# Patient Record
Sex: FEMALE | Race: BLACK OR AFRICAN AMERICAN | Employment: FULL TIME | URBAN - METROPOLITAN AREA
[De-identification: names, ages, dates, MRNs, and addresses within clinical notes are randomized per-mention and may not be internally consistent; named-entity substitution may affect disease eponyms.]

---

## 2019-11-04 ENCOUNTER — TRANSFERRED RECORDS (OUTPATIENT)
Dept: HEALTH INFORMATION MANAGEMENT | Facility: CLINIC | Age: 34
End: 2019-11-04

## 2019-11-04 LAB — PAP-ABSTRACT: NORMAL

## 2020-01-27 ENCOUNTER — OFFICE VISIT (OUTPATIENT)
Dept: OBGYN | Facility: CLINIC | Age: 35
End: 2020-01-27
Payer: COMMERCIAL

## 2020-01-27 VITALS
HEIGHT: 65 IN | HEART RATE: 105 BPM | BODY MASS INDEX: 26.86 KG/M2 | DIASTOLIC BLOOD PRESSURE: 72 MMHG | SYSTOLIC BLOOD PRESSURE: 128 MMHG | WEIGHT: 161.2 LBS

## 2020-01-27 DIAGNOSIS — D25.1 INTRAMURAL, SUBMUCOUS, AND SUBSEROUS LEIOMYOMA OF UTERUS: Primary | ICD-10-CM

## 2020-01-27 DIAGNOSIS — D25.0 INTRAMURAL, SUBMUCOUS, AND SUBSEROUS LEIOMYOMA OF UTERUS: Primary | ICD-10-CM

## 2020-01-27 DIAGNOSIS — N97.9 FEMALE INFERTILITY: ICD-10-CM

## 2020-01-27 DIAGNOSIS — D25.2 INTRAMURAL, SUBMUCOUS, AND SUBSEROUS LEIOMYOMA OF UTERUS: Primary | ICD-10-CM

## 2020-01-27 PROCEDURE — 99203 OFFICE O/P NEW LOW 30 MIN: CPT | Performed by: OBSTETRICS & GYNECOLOGY

## 2020-01-27 RX ORDER — FERROUS SULFATE 325(65) MG
325 TABLET ORAL
COMMUNITY
End: 2020-09-21

## 2020-01-27 RX ORDER — PRENATAL VIT/IRON FUM/FOLIC AC 27MG-0.8MG
1 TABLET ORAL DAILY
COMMUNITY
End: 2020-06-11

## 2020-01-27 ASSESSMENT — MIFFLIN-ST. JEOR: SCORE: 1434.14

## 2020-01-27 NOTE — PROGRESS NOTES
"Jose is a 34 year old   here to discuss her fibroids and her desire for pregnancy.  She has a long history.  I reviewed her previous imaging, clinic and operative reports through care everywhere.  She has a long history of fibroids and significant menorrhagia.  She has had to be on IV iron, due to the anemia.  She had an open myomectomy back in 2016.  She has also had a hysteroscopic polypectomy and been on lupron. Although, she wasn't able to complete the 6 month course, due to side effects.  She was recently seen by Infertility and diagnosed with bilateral tubal occlusion, although the exact cause isn't clear, it is presumed to be due to the fibroids.  ROS: No urinary frequency or dysuria, bladder or kidney problems  ROS: Ten point review of systems was reviewed and negative except the above.    PMH: Her past medical, surgical, and obstetric histories were reviewed and are documented in their appropriate chart areas.    ALL/Meds: Her medication and allergy histories were reviewed and are documented in their appropriate chart areas.    SH/FMH: Reviewed and documented in the appropriate area.    PE: /72   Pulse 105   Ht 1.638 m (5' 4.5\")   Wt 73.1 kg (161 lb 3.2 oz)   LMP 2020 (Exact Date)   Breastfeeding No   BMI 27.24 kg/m      U/S: 2019Measurements and comments  Uterus:  Longitudinal AP Transverse   8.9 7.0 9.4 cm   Position:  anteverted  Comments:  Irregular due to fibroids    Cervix and lower uterine segment are: Normal cervix and fibroids in CITLALI  Myometrium: heterogeneous    Myomata: numerous fibroids throughout the entire uterus - 10+ seen, but   not all documented due to size and difficulty seeing distinct outlines  Location Longitudinal AP Transverse    Anterior/ Right 4.3 3.9 5.2 cm submucosal: 0.6 cm to serosa; 25%   intracavitary   Anterior 2.1 1.6 2.2 cm intramural   Anterior 2.5 2.3 2.4 cm intramural   Fundal/ Right 3.8 3.5 3.2 subserosal   Left/CITLALI 2.8 2.1 2.1 subserosal "       Saline infusion sonohysterogram: no - patient's cavity was filled with   fluid (possible blood)  Endometrium: Anterior: 3.7 mm; Posterior: 4.3 mm (posterior to this   portion of the cavity showed more fluid with motion)  Blood flow seen throughout both portions of the cavity, and a small area   seen within the cavity from the posterior wall (possible blood clot)   measuring 0.7 x 0.5 x 0.6 cm    Right ovary:   Not visualized due to shadowing & gas    Left ovary:  Longitudinal AP Transverse   2.5 2.0 1.5 cm   Comments: appears normal    Adnexa:  no masses seen    Peritoneal fluid: absent    Other procedures done: none    Impression:   Uterus shows presence of multiple myomata as noted on previous ultrasound   examinations.  Overall there is not much of change compared to the   previous study 2018.  She the largest fibroid is on the anterior   wall towards the right of midline measures 4.3 cm x 3.9 cm x 5.2 cm it is   about 0.6 cm from the serosa and on the other side it this 25 percent in   the cavity.  She the other fibroids are both intramural and some R   subserosal as noted above.  Endometrial cavity showed presence of a small amount of fluid possibly   blood.  Part of the fibroid on the anterior wall right side is in the   endometrial cavity.  Endometrial lining appears normal.  Right ovary could not be very well visualized.  Left ovary appears normal.  No adnexal masses seen. .  The recommendation from Infertility is a myomectomy of the largest fibroids and then IVF. She would need a  for delivery, given the size of the largest fibroid.  She wanted to be seen today as she wants to know why her tubes are blocked.  I explained, that with no other history (PID, Endometriosis), the fibroids are the most likely answer.  Also, knowing the why, will not help her conceive.  Discussed the option of embolization vs surgical myomectomy.  I would be happy to refer her, but at this time, she wants to  follow up with the Infertility clinic she has seen    A/P:   Jose presents with (D25.1,  D25.0,  D25.2) Intramural, submucous, and subserous leiomyoma of uterus  (primary encounter diagnosis)  (N97.9) Female infertility  Comment: Out of 30 minutes spent face to face with the patient, > 50% of this was spent in consultation.  Plan: Follow up as needed        -    No orders of the defined types were placed in this encounter.        Geoffrey Watkins MD FACOG

## 2020-01-27 NOTE — PATIENT INSTRUCTIONS
If you have any questions regarding your visit, Please contact your care team.    [facility]: [phone]      Women s Health CLINIC HOURS TELEPHONE NUMBER   MD Daisy Cantu CMA Susie -    NICO Hu       Monday:       7:30-4:15 [place]  Wednesday:      Administrative  Thursday:       7:30-4:15 [place]  Friday:       Administrative     [facility]  [address] [place]  [address]  [phone] ask for [facility]  [address] [place] [address]  [phone] ask for [facility]    Imaging Scheduling for [place]:  [phone]    Imaging Scheduling for [place]: [phone]       Urgent Care locations:    [facility] Saturday and Sunday   9 am - 5 pm    Monday-Friday   12 pm - 8 pm  Saturday and Sunday   9 am - 5 pm   [phone]     [facility] Labor and Delivery:  [phone]    If you need a medication refill, please contact your pharmacy. Please allow 3 business days for your refill to be completed.  As always, Thank you for trusting us with your healthcare needs!

## 2020-03-24 ENCOUNTER — OFFICE VISIT (OUTPATIENT)
Dept: OBGYN | Facility: CLINIC | Age: 35
End: 2020-03-24
Payer: COMMERCIAL

## 2020-03-24 VITALS
DIASTOLIC BLOOD PRESSURE: 72 MMHG | HEIGHT: 64 IN | BODY MASS INDEX: 27.49 KG/M2 | WEIGHT: 161 LBS | SYSTOLIC BLOOD PRESSURE: 110 MMHG | HEART RATE: 68 BPM

## 2020-03-24 DIAGNOSIS — D21.9 MYOMA: ICD-10-CM

## 2020-03-24 DIAGNOSIS — N92.0 MENORRHAGIA WITH REGULAR CYCLE: Primary | ICD-10-CM

## 2020-03-24 LAB — HGB BLD-MCNC: 9.9 G/DL (ref 11.7–15.7)

## 2020-03-24 PROCEDURE — 99214 OFFICE O/P EST MOD 30 MIN: CPT | Performed by: OBSTETRICS & GYNECOLOGY

## 2020-03-24 PROCEDURE — 85018 HEMOGLOBIN: CPT | Performed by: OBSTETRICS & GYNECOLOGY

## 2020-03-24 PROCEDURE — 36415 COLL VENOUS BLD VENIPUNCTURE: CPT | Performed by: OBSTETRICS & GYNECOLOGY

## 2020-03-24 ASSESSMENT — ANXIETY QUESTIONNAIRES
6. BECOMING EASILY ANNOYED OR IRRITABLE: NOT AT ALL
GAD7 TOTAL SCORE: 0
1. FEELING NERVOUS, ANXIOUS, OR ON EDGE: NOT AT ALL
5. BEING SO RESTLESS THAT IT IS HARD TO SIT STILL: NOT AT ALL
3. WORRYING TOO MUCH ABOUT DIFFERENT THINGS: NOT AT ALL
7. FEELING AFRAID AS IF SOMETHING AWFUL MIGHT HAPPEN: NOT AT ALL
2. NOT BEING ABLE TO STOP OR CONTROL WORRYING: NOT AT ALL

## 2020-03-24 ASSESSMENT — MIFFLIN-ST. JEOR: SCORE: 1417.35

## 2020-03-24 ASSESSMENT — PATIENT HEALTH QUESTIONNAIRE - PHQ9
SUM OF ALL RESPONSES TO PHQ QUESTIONS 1-9: 0
5. POOR APPETITE OR OVEREATING: NOT AT ALL

## 2020-03-24 NOTE — Clinical Note
Please abstract the following data from this visit with this patient into the appropriate field in Epic:        Other Tests found in the patient's chart through Chart Review/Care Everywhere:    Pap smear done by [facility] this date: 11/4/19    Note to Abstraction: If this section is blank, no results were found via Chart Review/Care Everywhere.

## 2020-03-24 NOTE — NURSING NOTE
Please abstract the following data from this visit with this patient into the appropriate field in Epic:        Other Tests found in the patient's chart through Chart Review/Care Everywhere:    Pap smear done by [facility] this date: 11/4/19    Note to Abstraction: If this section is blank, no results were found via Chart Review/Care Everywhere.

## 2020-03-24 NOTE — PROGRESS NOTES
SUBJECTIVE:                                                   Jose Hurtado is a 34 year old female who presents to clinic today for the following health issue(s):  Patient presents with:  Consult: here to discuss treatment for fibroids      HPI the patient is a 34-year-old who is seen at this time for evaluation of a rapidly enlarging myomatous uterus that has caused menorrhagia and severe anemia.  The patient has had hemoglobins in the 7 range and has had iron infusions performed.  Her menses can last up to 7 days for her fiber which can be flooding.  She had an iron infusion last week.  She has had at least 2 hysteroscopic procedures for removals of polyps and a small fibroid but that has had no impact on her bleeding.  A recent hysterosalpingogram showed no fill of either fallopian tube.       Patient's last menstrual period was 2020..     Patient is sexually active, .  Using none for contraception.    reports that she has never smoked. She has never used smokeless tobacco.    STD testing offered?  Declined    Health maintenance updated:  yes    Today's PHQ-2 Score: No flowsheet data found.  Today's PHQ-9 Score:   PHQ-9 SCORE 3/24/2020   PHQ-9 Total Score 0     Today's KATHI-7 Score:   KATHI-7 SCORE 3/24/2020   Total Score 0       Problem list and histories reviewed & adjusted, as indicated.  Additional history: as documented.    Patient Active Problem List   Diagnosis     Lumbar pain     Past Surgical History:   Procedure Laterality Date     AS HYSTEROSCOPY W ENDOMETRIAL BX/POLYPECTOMY W/WO D&C  2018     MYOMECTOMY UTERUS  2016      Social History     Tobacco Use     Smoking status: Never Smoker     Smokeless tobacco: Never Used   Substance Use Topics     Alcohol use: No      Problem (# of Occurrences) Relation (Name,Age of Onset)    No Known Problems (6) Mother, Father, Maternal Grandmother, Maternal Grandfather, Paternal Grandmother, Paternal Grandfather            Current Outpatient Medications  "  Medication Sig     ferrous sulfate (FEROSUL) 325 (65 Fe) MG tablet Take 325 mg by mouth daily (with breakfast)     Prenatal Vit-Fe Fumarate-FA (PRENATAL MULTIVITAMIN W/IRON) 27-0.8 MG tablet Take 1 tablet by mouth daily     No current facility-administered medications for this visit.      No Known Allergies    ROS:  12 point review of systems negative other than symptoms noted below or in the HPI.  No urinary frequency or dysuria, bladder or kidney problems      OBJECTIVE:     /72   Pulse 68   Ht 1.613 m (5' 3.5\")   Wt 73 kg (161 lb)   LMP 03/20/2020   BMI 28.07 kg/m    Body mass index is 28.07 kg/m .    Exam:  Constitutional:  Appearance: Well nourished, well developed alert, in no acute distress  Gastrointestinal:  Abdominal Examination:  Abdomen nontender to palpation, tone normal without rigidity or guarding, no masses present, umbilicus without lesions; Liver/Spleen:  No hepatomegaly present, liver nontender to palpation; Hernias:  No hernias present  Lymphatic: Lymph Nodes:  No other lymphadenopathy present  Skin: General Inspection:  No rashes present, no lesions present, no areas of discoloration.  Neurologic:  Mental Status:  Oriented X3.  Normal strength and tone, sensory exam grossly normal, mentation intact and speech normal.    Psychiatric:  Mentation appears normal and affect normal/bright.  Pelvic Exam:  External Genitalia:     Normal appearance for age, no discharge present, no tenderness present, no inflammatory lesions present, color normal  Vagina:     Normal vaginal vault without central or paravaginal defects, no discharge present, no inflammatory lesions present, no masses present  Bladder:     Nontender to palpation  Urethra:   Urethral Body:  Urethra palpation normal, urethra structural support normal   Urethral Meatus:  No erythema or lesions present  Cervix:     Appearance healthy, no lesions present, nontender to palpation, no bleeding present  Uterus: 18 weeks size " multi-bosselated myomatous uterus.     Adnexa:     No adnexal tenderness present, no adnexal masses present  Perineum:     Perineum within normal limits, no evidence of trauma, no rashes or skin lesions present  Anus:     Anus within normal limits, no hemorrhoids present  Inguinal Lymph Nodes:     No lymphadenopathy present  Pubic Hair:     Normal pubic hair distribution for age  Genitalia and Groin:     No rashes present, no lesions present, no areas of discoloration, no masses present       In-Clinic Test Results:hgb 9.4 gm %    ASSESSMENT/PLAN:                                                      34-year-old patient with massive myomatous uterus and menorrhagia causing anemia.  By hysterosalpingogram she has interruption of both tubes by pressure intrinsically.  She has been suppressed with Lupron before.  Due to the pandemic situation we are not able to offer definitive surgery which at this point would be an exploratory laparotomy with multiple myomectomies in an attempt to try to get a functional unit and potentially open up tubal function.  We have gone over all the risks and complications of this.  The patient has been on Lupron before but states that she had a very serious reaction.  As I cannot guide her as to when definitive surgery would be available it would be nice to shut her down.  If she refuses any form of suppression we have no choice but to continue on with her iron infusions and try to keep her hemoglobin stable.  She is a RN at [employer].            Tato Jackson MD  [facility] [place]

## 2020-03-25 ASSESSMENT — ANXIETY QUESTIONNAIRES: GAD7 TOTAL SCORE: 0

## 2020-04-01 ENCOUNTER — ANCILLARY PROCEDURE (OUTPATIENT)
Dept: ULTRASOUND IMAGING | Facility: CLINIC | Age: 35
End: 2020-04-01
Attending: OBSTETRICS & GYNECOLOGY
Payer: COMMERCIAL

## 2020-04-01 ENCOUNTER — OFFICE VISIT (OUTPATIENT)
Dept: OBGYN | Facility: CLINIC | Age: 35
End: 2020-04-01
Attending: OBSTETRICS & GYNECOLOGY
Payer: COMMERCIAL

## 2020-04-01 VITALS
HEIGHT: 64 IN | HEART RATE: 68 BPM | SYSTOLIC BLOOD PRESSURE: 114 MMHG | DIASTOLIC BLOOD PRESSURE: 60 MMHG | BODY MASS INDEX: 27.83 KG/M2 | WEIGHT: 163 LBS

## 2020-04-01 DIAGNOSIS — D21.9 MYOMA: Primary | ICD-10-CM

## 2020-04-01 DIAGNOSIS — N92.0 MENORRHAGIA WITH REGULAR CYCLE: ICD-10-CM

## 2020-04-01 DIAGNOSIS — D64.89 ANEMIA DUE TO OTHER CAUSE, NOT CLASSIFIED: ICD-10-CM

## 2020-04-01 PROCEDURE — 99214 OFFICE O/P EST MOD 30 MIN: CPT | Performed by: OBSTETRICS & GYNECOLOGY

## 2020-04-01 PROCEDURE — 76830 TRANSVAGINAL US NON-OB: CPT | Performed by: OBSTETRICS & GYNECOLOGY

## 2020-04-01 ASSESSMENT — MIFFLIN-ST. JEOR: SCORE: 1426.42

## 2020-04-01 NOTE — PROGRESS NOTES
SUBJECTIVE:                                                   Jose Hurtado is a 34 year old female who presents to clinic today for the following health issue(s):  Patient presents with:  Ultrasound      HPI: The patient is seen at this time for evaluation of flooding menorrhagia and severe anemia.  She has had numerous hemoglobins down in the 7 range.  Her last hemoglobin in the office was 9.4 g%.  Ultrasound is planned for today.  When she gets her menses she can bleed anywhere from 7 to 14 days.  She was treated with transenemic acid this last cycle with no improvement.  She is severely debilitated by both the pain from the fibroids to heavy bleeding and the anemia.      Patient's last menstrual period was 2020..     Patient is sexually active, .  Using none for contraception.    reports that she has never smoked. She has never used smokeless tobacco.    STD testing offered?  Declined    Health maintenance updated:  yes    Today's PHQ-2 Score:   PHQ-2 (  [employer]) 2020   Q1: Little interest or pleasure in doing things 0   Q2: Feeling down, depressed or hopeless 0   PHQ-2 Score 0     Today's PHQ-9 Score:   PHQ-9 SCORE 3/24/2020   PHQ-9 Total Score 0     Today's KATHI-7 Score:   KATHI-7 SCORE 3/24/2020   Total Score 0       Problem list and histories reviewed & adjusted, as indicated.  Additional history: as documented.    Patient Active Problem List   Diagnosis     Lumbar pain     Past Surgical History:   Procedure Laterality Date     AS HYSTEROSCOPY W ENDOMETRIAL BX/POLYPECTOMY W/WO D&C  2018     MYOMECTOMY UTERUS  2016      Social History     Tobacco Use     Smoking status: Never Smoker     Smokeless tobacco: Never Used   Substance Use Topics     Alcohol use: No      Problem (# of Occurrences) Relation (Name,Age of Onset)    No Known Problems (6) Mother, Father, Maternal Grandmother, Maternal Grandfather, Paternal Grandmother, Paternal Grandfather            Current Outpatient Medications  "  Medication Sig     ferrous sulfate (FEROSUL) 325 (65 Fe) MG tablet Take 325 mg by mouth daily (with breakfast)     Prenatal Vit-Fe Fumarate-FA (PRENATAL MULTIVITAMIN W/IRON) 27-0.8 MG tablet Take 1 tablet by mouth daily     No current facility-administered medications for this visit.      No Known Allergies    ROS:  12 point review of systems negative other than symptoms noted below or in the HPI.  No urinary frequency or dysuria, bladder or kidney problems      OBJECTIVE:     /60   Pulse 68   Ht 1.613 m (5' 3.5\")   Wt 73.9 kg (163 lb)   LMP 03/20/2020   BMI 28.42 kg/m    Body mass index is 28.42 kg/m .    Exam:  Constitutional:  Appearance: Well nourished, well developed alert, in no acute distress  No Pelvic Exam performed     In-Clinic Test Results:  Results for orders placed or performed in visit on 04/01/20   US Pelvic Complete w Transvaginal     Status: None    Narrative    US Pelvic Complete w Transvaginal   Order #: [id number] Accession #: [id number]   Study Notes?      Irasema Madison on 4/1/2020 ? 2:07 PM    ?   Gynecological Ultrasound Report  Pelvic U/S - Transvaginal  [facility]  Referring Provider: Dr. Tato Jackson  Sonographer:  Irasema Madison  Indication: Bleeding/Menses- Menorrhagia (heavy menses)  LMP: Patient's last menstrual period was 03/20/2020.  History:   Gynecological Ultrasonography:   Uterus: anteverted. Large myomatous uterus; 2 largest fibroids #1= 6.8x   5.6x 6cm,  #2= 6.3x 6.1x 5.3  Size: 14.17 x 9.55 x 10.36 cm  Endometrium: Thickness Total 3.51 mm  Findings: Endometrium only visualized in CITLALI  Right Ovary: 3.35 x 3.34 x 2.82 cm. Wnl  Left Ovary: 2.76 x 2.67 x 2.62 cm. Wnl  Cul de Sac Free Fluid: No free fluid  ?   Impression: Large myomatous uterus with multiple fibroids present  ?   ?   ?         Discussed in office         ASSESSMENT/PLAN:                                                      34-year-old patient with markedly enlarged myomatous " uterus.  Her to biggest fibroids are over 6 cm.  There are multiple fibroids throughout.  She is not bleeding at this time but anticipates a cycle in the next 10 days.  We have encouraged her to continue with her iron infusions and dietary supplements.  We discussed the possibility of suppression.  She did not tolerate Depo-Lupron due to side effects.  We did discuss Depo-Provera as a means of trying to suppress her down to get past this operating room shut down due to the pandemic.  If she bleeds so heavily that she is hospitalized we may need to move her up to an emergent position on the schedule for an exploratory laparotomy with multiple myomectomies.  The patient fully understands the ramifications of the surgery and the treatments we have discussed.  She will contact us with her next cycle and we will try to set something up for early summer.          Tato Jackson MD  [facility] [place]

## 2020-05-14 ENCOUNTER — TELEPHONE (OUTPATIENT)
Dept: OBGYN | Facility: CLINIC | Age: 35
End: 2020-05-14

## 2020-05-14 NOTE — TELEPHONE ENCOUNTER
"4/1/20 seen by Dr Jackson for fibroids and heavy bleeding and anemia  \" If she bleeds so heavily that she is hospitalized we may need to move her up to an emergent position on the schedule for an exploratory laparotomy with multiple myomectomies.  The patient fully understands the ramifications of the surgery and the treatments we have discussed.  She will contact us with her next cycle and we will try to set something up for early summer.\"     Was instructed to call and give an update on her menstrual cycles:  still very heavy, the first four days are very heavy \"nonstop\" and Day 5 lighter and total lasting 7 days.  Still having pain from the fibroids on and off during the day. Inquiring on future surgery and scheduling.    Routing to Dr Jackson to advise based on her update.  Call with response.    Guillermina Justice RN on 5/14/2020 at 11:43 AM        "

## 2020-05-20 ENCOUNTER — PREP FOR PROCEDURE (OUTPATIENT)
Dept: OBGYN | Facility: CLINIC | Age: 35
End: 2020-05-20

## 2020-05-20 ENCOUNTER — TELEPHONE (OUTPATIENT)
Dept: OBGYN | Facility: CLINIC | Age: 35
End: 2020-05-20

## 2020-05-20 DIAGNOSIS — D21.9 MYOMA: Primary | ICD-10-CM

## 2020-05-20 NOTE — TELEPHONE ENCOUNTER
Associated Diagnoses     Myoma [D21.9]  - Primary        Source Order Set     Order Set Name  Order ID     [id number]    Case Request: Case Info     Panel 1 (Provider: Tato Jackson MD)     Procedures  Laterality  Anesthesia  Region    LAPAROTOMY, multiple myomectomies  N/A  General        Requested date:     Location:  OR    Patient class:        Pre-op diagnoses:  Myoma    Scheduling Instructions     Additional Instructions for the Case            Detailed Information     Priority and Order Details             Order Questions     Question  Answer  Comment    Clinical Tier:  Tier 2 (Intermediate)

## 2020-05-20 NOTE — TELEPHONE ENCOUNTER
Patient called back wanting to know when she can schedule. Gave her some dates that are open now in June, but could potentially fill as he is scheduling cases daily. She will check with her work and call back.

## 2020-05-27 DIAGNOSIS — Z11.59 ENCOUNTER FOR SCREENING FOR OTHER VIRAL DISEASES: Primary | ICD-10-CM

## 2020-05-27 PROBLEM — D21.9 MYOMA: Status: ACTIVE | Noted: 2020-05-27

## 2020-05-27 NOTE — TELEPHONE ENCOUNTER
Type of surgery: Exploratory Laparotomy  Location of surgery: [facility]  Date and time of surgery:  6/23/20 9:30-11:40  Surgeon: Renetta  Pre-Op Appt Date: patient needs to call back to schedule  Post-Op Appt Date:    Packet sent out: Yes 5/27/20  Pre-cert/Authorization completed:  TBD  Date: Tamanna 5/27/20     [id number]  D21.9

## 2020-06-11 ENCOUNTER — OFFICE VISIT (OUTPATIENT)
Dept: OBGYN | Facility: CLINIC | Age: 35
End: 2020-06-11
Payer: COMMERCIAL

## 2020-06-11 VITALS — DIASTOLIC BLOOD PRESSURE: 68 MMHG | WEIGHT: 164 LBS | BODY MASS INDEX: 28.6 KG/M2 | SYSTOLIC BLOOD PRESSURE: 112 MMHG

## 2020-06-11 DIAGNOSIS — Z01.818 PRE-OP EXAMINATION: Primary | ICD-10-CM

## 2020-06-11 LAB — HGB BLD-MCNC: 10.1 G/DL (ref 11.7–15.7)

## 2020-06-11 PROCEDURE — 85018 HEMOGLOBIN: CPT | Performed by: OBSTETRICS & GYNECOLOGY

## 2020-06-11 PROCEDURE — 99213 OFFICE O/P EST LOW 20 MIN: CPT | Performed by: OBSTETRICS & GYNECOLOGY

## 2020-06-11 PROCEDURE — 36415 COLL VENOUS BLD VENIPUNCTURE: CPT | Performed by: OBSTETRICS & GYNECOLOGY

## 2020-06-11 NOTE — PROGRESS NOTES
SUBJECTIVE:                                                   Jose Hurtado is a 34 year old female who presents to clinic today for the following health issue(s):  Patient presents with:  Pre-Op Exam: LAPAROTOMY, multiple myomectomies 2020          HPI: The patient is a 34-year-old  0 who has been found to have 2 large uterine fibroids at least.  1 is 6.8 and the other is 6.3 cm.  Her ovaries look normal by ultrasound.  She is admitted at this time for exploratory laparotomy for a excision of the fibroids and reconstruction of the uterus.  She has desire for fertility down the road.  She has been anemic in the past and has been on iron and hemoglobin is pending.  She denies any recent infections.  She will be COVID tested 3 days prior to the procedure.  She understands the risk and complications length of stay and disability from work afterwards.      Patient's last menstrual period was 2020 (exact date)..     Patient is sexually active, .  Using none for contraception.    reports that she has never smoked. She has never used smokeless tobacco.    STD testing offered?  Declined    Health maintenance updated:  yes    Today's PHQ-2 Score:   PHQ-2 (  [employer]) 2020   Q1: Little interest or pleasure in doing things 0   Q2: Feeling down, depressed or hopeless 0   PHQ-2 Score 0     Today's PHQ-9 Score:   PHQ-9 SCORE 3/24/2020   PHQ-9 Total Score 0     Today's KATHI-7 Score:   KATHI-7 SCORE 3/24/2020   Total Score 0       Problem list and histories reviewed & adjusted, as indicated.  Additional history: as documented.    Patient Active Problem List   Diagnosis     Lumbar pain     Myoma     Past Surgical History:   Procedure Laterality Date     AS HYSTEROSCOPY W ENDOMETRIAL BX/POLYPECTOMY W/WO D&C  2018     MYOMECTOMY UTERUS  2016      Social History     Tobacco Use     Smoking status: Never Smoker     Smokeless tobacco: Never Used   Substance Use Topics     Alcohol use: No      Problem (# of  Occurrences) Relation (Name,Age of Onset)    No Known Problems (6) Mother, Father, Maternal Grandmother, Maternal Grandfather, Paternal Grandmother, Paternal Grandfather            Current Outpatient Medications   Medication Sig     ferrous sulfate (FEROSUL) 325 (65 Fe) MG tablet Take 325 mg by mouth daily (with breakfast)     No current facility-administered medications for this visit.      No Known Allergies    ROS:  12 point review of systems negative other than symptoms noted below or in the HPI.  No urinary frequency or dysuria, bladder or kidney problems      OBJECTIVE:     /68   Wt 74.4 kg (164 lb)   LMP 06/04/2020 (Exact Date)   Breastfeeding No   BMI 28.60 kg/m    Body mass index is 28.6 kg/m .    Exam:  Constitutional:  Appearance: Well nourished, well developed alert, in no acute distress  Neck:  Lymph Nodes:  No lymphadenopathy present; Thyroid:  Gland size normal, nontender, no nodules or masses present on palpation  Chest:  Respiratory Effort:  Breathing unlabored. Clear to auscultation bilaterally.   Cardiovascular: Heart: Auscultation:  Regular rate, normal rhythm, no murmurs present  Gastrointestinal:  Abdominal Examination:  Abdomen nontender to palpation, tone normal without rigidity or guarding, no masses present, umbilicus without lesions; Liver/Spleen:  No hepatomegaly present, liver nontender to palpation; Hernias:  No hernias present  Lymphatic: Lymph Nodes:  No other lymphadenopathy present  Skin: General Inspection:  No rashes present, no lesions present, no areas of discoloration.  Neurologic:  Mental Status:  Oriented X3.  Normal strength and tone, sensory exam grossly normal, mentation intact and speech normal.    Psychiatric:  Mentation appears normal and affect normal/bright.  No Pelvic Exam performed     In-Clinic Test Results: 10.1 g%      ASSESSMENT/PLAN:                                                      34-year-old female with large uterine fibroids admitted at this  time for open myomectomy.  She understands the risks and complications including general anesthesia blood loss infection injury to bowel bladder and ureters.  She agrees that she will not attempt pregnancy for at least 4 months after surgery and that she will need a  section for delivery if the dissections are deep.      Tato Jackson MD  [facility]

## 2020-06-20 DIAGNOSIS — Z11.59 ENCOUNTER FOR SCREENING FOR OTHER VIRAL DISEASES: ICD-10-CM

## 2020-06-20 PROCEDURE — 99000 SPECIMEN HANDLING OFFICE-LAB: CPT | Performed by: OBSTETRICS & GYNECOLOGY

## 2020-06-20 PROCEDURE — U0003 INFECTIOUS AGENT DETECTION BY NUCLEIC ACID (DNA OR RNA); SEVERE ACUTE RESPIRATORY SYNDROME CORONAVIRUS 2 (SARS-COV-2) (CORONAVIRUS DISEASE [COVID-19]), AMPLIFIED PROBE TECHNIQUE, MAKING USE OF HIGH THROUGHPUT TECHNOLOGIES AS DESCRIBED BY CMS-2020-01-R: HCPCS | Mod: 90 | Performed by: OBSTETRICS & GYNECOLOGY

## 2020-06-22 LAB
SARS-COV-2 RNA SPEC QL NAA+PROBE: NOT DETECTED
SPECIMEN SOURCE: NORMAL

## 2020-06-22 RX ORDER — ACETAMINOPHEN 325 MG/1
325-650 TABLET ORAL 4 TIMES DAILY PRN
COMMUNITY
End: 2020-09-21

## 2020-06-22 RX ORDER — TRANEXAMIC ACID 650 MG/1
1300 TABLET ORAL 2 TIMES DAILY PRN
COMMUNITY
End: 2020-09-21

## 2020-06-22 RX ORDER — PHENAZOPYRIDINE HYDROCHLORIDE 200 MG/1
200 TABLET, FILM COATED ORAL ONCE
Status: CANCELLED | OUTPATIENT
Start: 2020-06-22 | End: 2020-06-22

## 2020-06-22 NOTE — PROGRESS NOTES
PTA medications updated by Medication Scribe prior to surgery via phone call with patient      -LAST DOSES ENTERED BY NURSE-    Medication history sources: Patient, Surescripts and H&P  Medication history source reliability: Good  Adherence assessment: N/A Not Observed    Significant changes made to the medication list:  None      Additional medication history information:   None        Prior to Admission medications    Medication Sig Last Dose Taking? Auth Provider   acetaminophen (TYLENOL) 325 MG tablet Take 325-650 mg by mouth 4 times daily as needed for mild pain  at PRN Yes Reported, Patient   ferrous sulfate (FEROSUL) 325 (65 Fe) MG tablet Take 325 mg by mouth daily (with breakfast)  Yes Reported, Patient   tranexamic acid (LYSTEDA) 650 MG tablet Take 1,300 mg by mouth 2 times daily as needed (during menses)  at PRN Yes Reported, Patient

## 2020-06-22 NOTE — H&P
Office Visit     2020  [facility] Tato Plasencia MD   OB/Gyn   Myoma +2 more   Dx   Ultrasound ; Referred by Tato Jackson MD   Reason for Visit    Progress Notes           SUBJECTIVE:                                                   Jose Hurtado is a 34 year old female who presents to clinic today for the following health issue(s):  Patient presents with:  Ultrasound       HPI: The patient is seen at this time for evaluation of flooding menorrhagia and severe anemia.  She has had numerous hemoglobins down in the 7 range.  Her last hemoglobin in the office was 9.4 g%.  Ultrasound is planned for today.  When she gets her menses she can bleed anywhere from 7 to 14 days.  She was treated with transenemic acid this last cycle with no improvement.  She is severely debilitated by both the pain from the fibroids to heavy bleeding and the anemia.        Patient's last menstrual period was 2020..      Patient is sexually active, .  Using none for contraception.    reports that she has never smoked. She has never used smokeless tobacco.     STD testing offered?  Declined     Health maintenance updated:  yes     Today's PHQ-2 Score:   PHQ-2 (  [employer]) 2020   Q1: Little interest or pleasure in doing things 0   Q2: Feeling down, depressed or hopeless 0   PHQ-2 Score 0     Today's PHQ-9 Score:   PHQ-9 SCORE 3/24/2020   PHQ-9 Total Score 0     Today's KATHI-7 Score:   KATHI-7 SCORE 3/24/2020   Total Score 0        Problem list and histories reviewed & adjusted, as indicated.  Additional history: as documented.         Patient Active Problem List   Diagnosis     Lumbar pain             Past Surgical History:   Procedure Laterality Date     AS HYSTEROSCOPY W ENDOMETRIAL BX/POLYPECTOMY W/WO D&C   2018     MYOMECTOMY UTERUS   2016      Social History           Tobacco Use     Smoking status: Never Smoker     Smokeless tobacco: Never Used   Substance Use Topics     Alcohol use: No      " Problem (# of Occurrences) Relation (Name,Age of Onset)     No Known Problems (6) Mother, Father, Maternal Grandmother, Maternal Grandfather, Paternal Grandmother, Paternal Grandfather                   Current Outpatient Medications   Medication Sig     ferrous sulfate (FEROSUL) 325 (65 Fe) MG tablet Take 325 mg by mouth daily (with breakfast)     Prenatal Vit-Fe Fumarate-FA (PRENATAL MULTIVITAMIN W/IRON) 27-0.8 MG tablet Take 1 tablet by mouth daily      No current facility-administered medications for this visit.      No Known Allergies     ROS:  12 point review of systems negative other than symptoms noted below or in the HPI.  No urinary frequency or dysuria, bladder or kidney problems        OBJECTIVE:      /60   Pulse 68   Ht 1.613 m (5' 3.5\")   Wt 73.9 kg (163 lb)   LMP 03/20/2020   BMI 28.42 kg/m    Body mass index is 28.42 kg/m .     Exam:  Constitutional:  Appearance: Well nourished, well developed alert, in no acute distress  No Pelvic Exam performed      In-Clinic Test Results:      Results for orders placed or performed in visit on 04/01/20   US Pelvic Complete w Transvaginal     Status: None     Narrative     US Pelvic Complete w Transvaginal   Order #: [id number] Accession #: [id number]   Study Notes?      Irasema Madison on 4/1/2020 ? 2:07 PM    ?   Gynecological Ultrasound Report  Pelvic U/S - Transvaginal  [facility]  Referring Provider: Dr. Tato Jackson  Sonographer:  Irasema Madison  Indication: Bleeding/Menses- Menorrhagia (heavy menses)  LMP: Patient's last menstrual period was 03/20/2020.  History:   Gynecological Ultrasonography:   Uterus: anteverted. Large myomatous uterus; 2 largest fibroids #1= 6.8x   5.6x 6cm,  #2= 6.3x 6.1x 5.3  Size: 14.17 x 9.55 x 10.36 cm  Endometrium: Thickness Total 3.51 mm  Findings: Endometrium only visualized in CITLALI  Right Ovary: 3.35 x 3.34 x 2.82 cm. Wnl  Left Ovary: 2.76 x 2.67 x 2.62 cm. Wnl  Cul de Sac Free Fluid: No free " "fluid  ?   Impression: Large myomatous uterus with multiple fibroids present  ?   ?   ?          Discussed in office           ASSESSMENT/PLAN:                                                       34-year-old patient with markedly enlarged myomatous uterus.  Her to biggest fibroids are over 6 cm.  There are multiple fibroids throughout.  She is not bleeding at this time but anticipates a cycle in the next 10 days.  We have encouraged her to continue with her iron infusions and dietary supplements.  We discussed the possibility of suppression.  She did not tolerate Depo-Lupron due to side effects.  We did discuss Depo-Provera as a means of trying to suppress her down to get past this operating room shut down due to the pandemic.  If she bleeds so heavily that she is hospitalized we may need to move her up to an emergent position on the schedule for an exploratory laparotomy with multiple myomectomies.  The patient fully understands the ramifications of the surgery and the treatments we have discussed.  She will contact us with her next cycle and we will try to set something up for early summer.              Tato Jackson MD  [facility]      Instructions      After Visit Summary (Automatic SnapShot taken 4/1/2020)   Additional Documentation     Vitals:     /60    Pulse 68    Ht 1.613 m (5' 3.5\")    Wt 73.9 kg (163 lb)    LMP 03/20/2020    BMI 28.42 kg/m     BSA 1.82 m     Flowsheets:     Vitals Reassessment,    NICU VS,    Anthropometrics,    Ambulatory Assessments       Encounter Info:     Billing Info,    History,    Allergies,    Detailed Report       AVS Reports     Date/Time  Report  Action  User    4/1/2020  2:29 PM  After Visit Summary  Automatically Generated  Tato Jackson MD    Encounter Information      Provider  Department  Encounter #  Center    4/1/2020 1:45 PM  Tato Jackson MD  [facility]  [id number]  [place]    Reviewed this Encounter      Medications  " Problems  Allergies  History    Shelbie Skinner MA    Reviewed  ADL, Alcohol, Drug Use, Family, Medical, Sexual Activity, Surgical, Tobacco    Communicable/Travel screen     Communicable/Travel Screening  1/27/2020  3/24/2020  4/1/2020  6/11/2020  6/20/2020    In the last month, have you been in contact with someone who was confirmed or suspected to have Coronavirus / COVID-19?  -  No / Unsure  No / Unsure  No / Unsure  Unable to assess    Do you have any of the following symptoms?  None of these  None of these  -  -  Unable to assess    Have you traveled internationally in the last month?  No  No  No  No  Unable to assess    View Complete Flowsheet     Orders Placed      None   Medication Changes        None      Medication List     Visit Diagnoses         Myoma      Menorrhagia with regular cycle      Anemia due to other cause, not classified      Problem List

## 2020-06-23 ENCOUNTER — ANESTHESIA (OUTPATIENT)
Dept: SURGERY | Facility: CLINIC | Age: 35
End: 2020-06-23
Payer: COMMERCIAL

## 2020-06-23 ENCOUNTER — SURGERY (OUTPATIENT)
Age: 35
End: 2020-06-23
Payer: COMMERCIAL

## 2020-06-23 ENCOUNTER — HOSPITAL ENCOUNTER (INPATIENT)
Facility: CLINIC | Age: 35
LOS: 1 days | Discharge: HOME | End: 2020-06-25
Attending: OBSTETRICS & GYNECOLOGY | Admitting: OBSTETRICS & GYNECOLOGY
Payer: COMMERCIAL

## 2020-06-23 ENCOUNTER — ANESTHESIA EVENT (OUTPATIENT)
Dept: SURGERY | Facility: CLINIC | Age: 35
End: 2020-06-23
Payer: COMMERCIAL

## 2020-06-23 DIAGNOSIS — D21.9 MYOMA: ICD-10-CM

## 2020-06-23 LAB
B-HCG SERPL-ACNC: <1 IU/L (ref 0–5)
HGB BLD-MCNC: 11.5 G/DL (ref 11.7–15.7)

## 2020-06-23 PROCEDURE — 88305 TISSUE EXAM BY PATHOLOGIST: CPT | Mod: 26 | Performed by: OBSTETRICS & GYNECOLOGY

## 2020-06-23 PROCEDURE — 25800030 ZZH RX IP 258 OP 636: Performed by: OBSTETRICS & GYNECOLOGY

## 2020-06-23 PROCEDURE — 25000132 ZZH RX MED GY IP 250 OP 250 PS 637: Performed by: ANESTHESIOLOGY

## 2020-06-23 PROCEDURE — 36415 COLL VENOUS BLD VENIPUNCTURE: CPT | Performed by: OBSTETRICS & GYNECOLOGY

## 2020-06-23 PROCEDURE — 25000566 ZZH SEVOFLURANE, EA 15 MIN: Performed by: OBSTETRICS & GYNECOLOGY

## 2020-06-23 PROCEDURE — 25000128 H RX IP 250 OP 636: Performed by: OBSTETRICS & GYNECOLOGY

## 2020-06-23 PROCEDURE — 25000125 ZZHC RX 250: Performed by: OBSTETRICS & GYNECOLOGY

## 2020-06-23 PROCEDURE — 37000008 ZZH ANESTHESIA TECHNICAL FEE, 1ST 30 MIN: Performed by: OBSTETRICS & GYNECOLOGY

## 2020-06-23 PROCEDURE — 71000012 ZZH RECOVERY PHASE 1 LEVEL 1 FIRST HR: Performed by: OBSTETRICS & GYNECOLOGY

## 2020-06-23 PROCEDURE — 12000000 ZZH R&B MED SURG/OB

## 2020-06-23 PROCEDURE — P9041 ALBUMIN (HUMAN),5%, 50ML: HCPCS | Performed by: NURSE ANESTHETIST, CERTIFIED REGISTERED

## 2020-06-23 PROCEDURE — 71000013 ZZH RECOVERY PHASE 1 LEVEL 1 EA ADDTL HR: Performed by: OBSTETRICS & GYNECOLOGY

## 2020-06-23 PROCEDURE — 40000170 ZZH STATISTIC PRE-PROCEDURE ASSESSMENT II: Performed by: OBSTETRICS & GYNECOLOGY

## 2020-06-23 PROCEDURE — 25000128 H RX IP 250 OP 636: Performed by: NURSE ANESTHETIST, CERTIFIED REGISTERED

## 2020-06-23 PROCEDURE — 85018 HEMOGLOBIN: CPT | Performed by: OBSTETRICS & GYNECOLOGY

## 2020-06-23 PROCEDURE — 36000056 ZZH SURGERY LEVEL 3 1ST 30 MIN: Performed by: OBSTETRICS & GYNECOLOGY

## 2020-06-23 PROCEDURE — 37000009 ZZH ANESTHESIA TECHNICAL FEE, EACH ADDTL 15 MIN: Performed by: OBSTETRICS & GYNECOLOGY

## 2020-06-23 PROCEDURE — 88305 TISSUE EXAM BY PATHOLOGIST: CPT | Mod: 26 | Performed by: PATHOLOGY

## 2020-06-23 PROCEDURE — 25000128 H RX IP 250 OP 636: Performed by: ANESTHESIOLOGY

## 2020-06-23 PROCEDURE — 25800030 ZZH RX IP 258 OP 636: Performed by: NURSE ANESTHETIST, CERTIFIED REGISTERED

## 2020-06-23 PROCEDURE — 25000125 ZZHC RX 250: Performed by: NURSE ANESTHETIST, CERTIFIED REGISTERED

## 2020-06-23 PROCEDURE — 36000058 ZZH SURGERY LEVEL 3 EA 15 ADDTL MIN: Performed by: OBSTETRICS & GYNECOLOGY

## 2020-06-23 PROCEDURE — C1765 ADHESION BARRIER: HCPCS | Performed by: OBSTETRICS & GYNECOLOGY

## 2020-06-23 PROCEDURE — 84702 CHORIONIC GONADOTROPIN TEST: CPT | Performed by: OBSTETRICS & GYNECOLOGY

## 2020-06-23 PROCEDURE — 27210794 ZZH OR GENERAL SUPPLY STERILE: Performed by: OBSTETRICS & GYNECOLOGY

## 2020-06-23 PROCEDURE — 88305 TISSUE EXAM BY PATHOLOGIST: CPT | Performed by: OBSTETRICS & GYNECOLOGY

## 2020-06-23 PROCEDURE — 58546 LAPARO-MYOMECTOMY COMPLEX: CPT | Performed by: OBSTETRICS & GYNECOLOGY

## 2020-06-23 RX ORDER — OXYCODONE AND ACETAMINOPHEN 5; 325 MG/1; MG/1
1-2 TABLET ORAL EVERY 4 HOURS PRN
Status: DISCONTINUED | OUTPATIENT
Start: 2020-06-23 | End: 2020-06-25 | Stop reason: HOSPADM

## 2020-06-23 RX ORDER — BUPIVACAINE HYDROCHLORIDE 2.5 MG/ML
INJECTION, SOLUTION INFILTRATION; PERINEURAL PRN
Status: DISCONTINUED | OUTPATIENT
Start: 2020-06-23 | End: 2020-06-23 | Stop reason: HOSPADM

## 2020-06-23 RX ORDER — ONDANSETRON 2 MG/ML
4 INJECTION INTRAMUSCULAR; INTRAVENOUS EVERY 30 MIN PRN
Status: DISCONTINUED | OUTPATIENT
Start: 2020-06-23 | End: 2020-06-23 | Stop reason: HOSPADM

## 2020-06-23 RX ORDER — MEPERIDINE HYDROCHLORIDE 25 MG/ML
12.5 INJECTION INTRAMUSCULAR; INTRAVENOUS; SUBCUTANEOUS
Status: DISCONTINUED | OUTPATIENT
Start: 2020-06-23 | End: 2020-06-23 | Stop reason: HOSPADM

## 2020-06-23 RX ORDER — FENTANYL CITRATE 50 UG/ML
25-50 INJECTION, SOLUTION INTRAMUSCULAR; INTRAVENOUS
Status: DISCONTINUED | OUTPATIENT
Start: 2020-06-23 | End: 2020-06-23 | Stop reason: HOSPADM

## 2020-06-23 RX ORDER — NALOXONE HYDROCHLORIDE 0.4 MG/ML
INJECTION, SOLUTION INTRAMUSCULAR; INTRAVENOUS; SUBCUTANEOUS PRN
Status: DISCONTINUED | OUTPATIENT
Start: 2020-06-23 | End: 2020-06-23

## 2020-06-23 RX ORDER — HYDROMORPHONE HYDROCHLORIDE 1 MG/ML
0.2 INJECTION, SOLUTION INTRAMUSCULAR; INTRAVENOUS; SUBCUTANEOUS
Status: DISCONTINUED | OUTPATIENT
Start: 2020-06-23 | End: 2020-06-23

## 2020-06-23 RX ORDER — LIDOCAINE 40 MG/G
CREAM TOPICAL
Status: DISCONTINUED | OUTPATIENT
Start: 2020-06-23 | End: 2020-06-25 | Stop reason: HOSPADM

## 2020-06-23 RX ORDER — GLYCOPYRROLATE 0.2 MG/ML
INJECTION, SOLUTION INTRAMUSCULAR; INTRAVENOUS PRN
Status: DISCONTINUED | OUTPATIENT
Start: 2020-06-23 | End: 2020-06-23

## 2020-06-23 RX ORDER — ONDANSETRON 2 MG/ML
INJECTION INTRAMUSCULAR; INTRAVENOUS PRN
Status: DISCONTINUED | OUTPATIENT
Start: 2020-06-23 | End: 2020-06-23

## 2020-06-23 RX ORDER — PROPOFOL 10 MG/ML
INJECTION, EMULSION INTRAVENOUS CONTINUOUS PRN
Status: DISCONTINUED | OUTPATIENT
Start: 2020-06-23 | End: 2020-06-23

## 2020-06-23 RX ORDER — OXYCODONE AND ACETAMINOPHEN 5; 325 MG/1; MG/1
1-2 TABLET ORAL EVERY 4 HOURS PRN
Qty: 36 TABLET | Refills: 0 | Status: SHIPPED | OUTPATIENT
Start: 2020-06-23 | End: 2020-07-07

## 2020-06-23 RX ORDER — ONDANSETRON 4 MG/1
4 TABLET, ORALLY DISINTEGRATING ORAL EVERY 6 HOURS PRN
Status: DISCONTINUED | OUTPATIENT
Start: 2020-06-23 | End: 2020-06-25 | Stop reason: HOSPADM

## 2020-06-23 RX ORDER — ONDANSETRON 2 MG/ML
4 INJECTION INTRAMUSCULAR; INTRAVENOUS EVERY 6 HOURS PRN
Status: DISCONTINUED | OUTPATIENT
Start: 2020-06-23 | End: 2020-06-25 | Stop reason: HOSPADM

## 2020-06-23 RX ORDER — NEOSTIGMINE METHYLSULFATE 1 MG/ML
VIAL (ML) INJECTION PRN
Status: DISCONTINUED | OUTPATIENT
Start: 2020-06-23 | End: 2020-06-23

## 2020-06-23 RX ORDER — CEFAZOLIN SODIUM 2 G/100ML
2 INJECTION, SOLUTION INTRAVENOUS EVERY 8 HOURS
Status: DISCONTINUED | OUTPATIENT
Start: 2020-06-23 | End: 2020-06-23

## 2020-06-23 RX ORDER — CEFAZOLIN SODIUM 2 G/100ML
2 INJECTION, SOLUTION INTRAVENOUS
Status: COMPLETED | OUTPATIENT
Start: 2020-06-23 | End: 2020-06-23

## 2020-06-23 RX ORDER — CEFAZOLIN SODIUM 1 G/3ML
1 INJECTION, POWDER, FOR SOLUTION INTRAMUSCULAR; INTRAVENOUS SEE ADMIN INSTRUCTIONS
Status: DISCONTINUED | OUTPATIENT
Start: 2020-06-23 | End: 2020-06-23 | Stop reason: HOSPADM

## 2020-06-23 RX ORDER — CEFAZOLIN SODIUM 2 G/100ML
2 INJECTION, SOLUTION INTRAVENOUS EVERY 8 HOURS
Status: COMPLETED | OUTPATIENT
Start: 2020-06-23 | End: 2020-06-24

## 2020-06-23 RX ORDER — HYDROMORPHONE HYDROCHLORIDE 1 MG/ML
.3-.5 INJECTION, SOLUTION INTRAMUSCULAR; INTRAVENOUS; SUBCUTANEOUS EVERY 10 MIN PRN
Status: DISCONTINUED | OUTPATIENT
Start: 2020-06-23 | End: 2020-06-23 | Stop reason: HOSPADM

## 2020-06-23 RX ORDER — NALOXONE HYDROCHLORIDE 0.4 MG/ML
.1-.4 INJECTION, SOLUTION INTRAMUSCULAR; INTRAVENOUS; SUBCUTANEOUS
Status: DISCONTINUED | OUTPATIENT
Start: 2020-06-23 | End: 2020-06-23 | Stop reason: HOSPADM

## 2020-06-23 RX ORDER — ACETAMINOPHEN 500 MG
1000 TABLET ORAL ONCE
Status: COMPLETED | OUTPATIENT
Start: 2020-06-23 | End: 2020-06-23

## 2020-06-23 RX ORDER — ONDANSETRON 4 MG/1
4 TABLET, ORALLY DISINTEGRATING ORAL EVERY 30 MIN PRN
Status: DISCONTINUED | OUTPATIENT
Start: 2020-06-23 | End: 2020-06-23 | Stop reason: HOSPADM

## 2020-06-23 RX ORDER — DEXAMETHASONE SODIUM PHOSPHATE 4 MG/ML
INJECTION, SOLUTION INTRA-ARTICULAR; INTRALESIONAL; INTRAMUSCULAR; INTRAVENOUS; SOFT TISSUE PRN
Status: DISCONTINUED | OUTPATIENT
Start: 2020-06-23 | End: 2020-06-23

## 2020-06-23 RX ORDER — PROPOFOL 10 MG/ML
INJECTION, EMULSION INTRAVENOUS PRN
Status: DISCONTINUED | OUTPATIENT
Start: 2020-06-23 | End: 2020-06-23

## 2020-06-23 RX ORDER — ALBUMIN, HUMAN INJ 5% 5 %
SOLUTION INTRAVENOUS CONTINUOUS PRN
Status: DISCONTINUED | OUTPATIENT
Start: 2020-06-23 | End: 2020-06-23

## 2020-06-23 RX ORDER — LIDOCAINE HYDROCHLORIDE 20 MG/ML
INJECTION, SOLUTION INFILTRATION; PERINEURAL PRN
Status: DISCONTINUED | OUTPATIENT
Start: 2020-06-23 | End: 2020-06-23

## 2020-06-23 RX ORDER — FENTANYL CITRATE 50 UG/ML
INJECTION, SOLUTION INTRAMUSCULAR; INTRAVENOUS PRN
Status: DISCONTINUED | OUTPATIENT
Start: 2020-06-23 | End: 2020-06-23

## 2020-06-23 RX ORDER — VASOPRESSIN 20 U/ML
INJECTION PARENTERAL
Status: DISCONTINUED
Start: 2020-06-23 | End: 2020-06-23 | Stop reason: HOSPADM

## 2020-06-23 RX ORDER — SODIUM CHLORIDE, SODIUM LACTATE, POTASSIUM CHLORIDE, CALCIUM CHLORIDE 600; 310; 30; 20 MG/100ML; MG/100ML; MG/100ML; MG/100ML
INJECTION, SOLUTION INTRAVENOUS CONTINUOUS PRN
Status: DISCONTINUED | OUTPATIENT
Start: 2020-06-23 | End: 2020-06-23

## 2020-06-23 RX ORDER — HYDROMORPHONE HYDROCHLORIDE 1 MG/ML
0.4 INJECTION, SOLUTION INTRAMUSCULAR; INTRAVENOUS; SUBCUTANEOUS
Status: DISCONTINUED | OUTPATIENT
Start: 2020-06-23 | End: 2020-06-25 | Stop reason: HOSPADM

## 2020-06-23 RX ORDER — SODIUM CHLORIDE, SODIUM LACTATE, POTASSIUM CHLORIDE, CALCIUM CHLORIDE 600; 310; 30; 20 MG/100ML; MG/100ML; MG/100ML; MG/100ML
INJECTION, SOLUTION INTRAVENOUS CONTINUOUS
Status: DISCONTINUED | OUTPATIENT
Start: 2020-06-23 | End: 2020-06-23 | Stop reason: HOSPADM

## 2020-06-23 RX ORDER — NALOXONE HYDROCHLORIDE 0.4 MG/ML
.1-.4 INJECTION, SOLUTION INTRAMUSCULAR; INTRAVENOUS; SUBCUTANEOUS
Status: DISCONTINUED | OUTPATIENT
Start: 2020-06-23 | End: 2020-06-25 | Stop reason: HOSPADM

## 2020-06-23 RX ORDER — MAGNESIUM HYDROXIDE 1200 MG/15ML
LIQUID ORAL PRN
Status: DISCONTINUED | OUTPATIENT
Start: 2020-06-23 | End: 2020-06-23 | Stop reason: HOSPADM

## 2020-06-23 RX ADMIN — ACETAMINOPHEN 1000 MG: 500 TABLET, FILM COATED ORAL at 09:00

## 2020-06-23 RX ADMIN — CEFAZOLIN SODIUM 2 G: 2 INJECTION, SOLUTION INTRAVENOUS at 10:20

## 2020-06-23 RX ADMIN — CEFAZOLIN SODIUM 2 G: 2 INJECTION, SOLUTION INTRAVENOUS at 17:11

## 2020-06-23 RX ADMIN — LIDOCAINE HYDROCHLORIDE 100 MG: 20 INJECTION, SOLUTION INFILTRATION; PERINEURAL at 10:12

## 2020-06-23 RX ADMIN — HYDROMORPHONE HYDROCHLORIDE 0.5 MG: 1 INJECTION, SOLUTION INTRAMUSCULAR; INTRAVENOUS; SUBCUTANEOUS at 10:44

## 2020-06-23 RX ADMIN — HYDROMORPHONE HYDROCHLORIDE 0.2 MG: 1 INJECTION, SOLUTION INTRAMUSCULAR; INTRAVENOUS; SUBCUTANEOUS at 18:00

## 2020-06-23 RX ADMIN — HYDROMORPHONE HYDROCHLORIDE 0.4 MG: 1 INJECTION, SOLUTION INTRAMUSCULAR; INTRAVENOUS; SUBCUTANEOUS at 20:01

## 2020-06-23 RX ADMIN — MIDAZOLAM 2 MG: 1 INJECTION INTRAMUSCULAR; INTRAVENOUS at 10:12

## 2020-06-23 RX ADMIN — HYDROMORPHONE HYDROCHLORIDE 0.5 MG: 1 INJECTION, SOLUTION INTRAMUSCULAR; INTRAVENOUS; SUBCUTANEOUS at 13:38

## 2020-06-23 RX ADMIN — VASOPRESSIN 22 ML: 20 INJECTION INTRAVENOUS at 11:53

## 2020-06-23 RX ADMIN — FENTANYL CITRATE 50 MCG: 50 INJECTION, SOLUTION INTRAMUSCULAR; INTRAVENOUS at 10:12

## 2020-06-23 RX ADMIN — ALBUMIN HUMAN: 0.05 INJECTION, SOLUTION INTRAVENOUS at 11:26

## 2020-06-23 RX ADMIN — NALOXONE HYDROCHLORIDE 40 MCG: 0.4 INJECTION, SOLUTION INTRAMUSCULAR; INTRAVENOUS; SUBCUTANEOUS at 12:52

## 2020-06-23 RX ADMIN — PROPOFOL 200 MG: 10 INJECTION, EMULSION INTRAVENOUS at 10:12

## 2020-06-23 RX ADMIN — FENTANYL CITRATE 50 MCG: 50 INJECTION, SOLUTION INTRAMUSCULAR; INTRAVENOUS at 11:03

## 2020-06-23 RX ADMIN — BUPIVACAINE HYDROCHLORIDE 10 ML: 2.5 INJECTION, SOLUTION EPIDURAL; INFILTRATION; INTRACAUDAL; PERINEURAL at 12:02

## 2020-06-23 RX ADMIN — SODIUM CHLORIDE 1000 ML: 0.9 IRRIGANT IRRIGATION at 10:48

## 2020-06-23 RX ADMIN — ROCURONIUM BROMIDE 10 MG: 10 INJECTION INTRAVENOUS at 11:30

## 2020-06-23 RX ADMIN — SODIUM CHLORIDE, POTASSIUM CHLORIDE, SODIUM LACTATE AND CALCIUM CHLORIDE: 600; 310; 30; 20 INJECTION, SOLUTION INTRAVENOUS at 10:07

## 2020-06-23 RX ADMIN — ROCURONIUM BROMIDE 50 MG: 10 INJECTION INTRAVENOUS at 10:12

## 2020-06-23 RX ADMIN — PHENYLEPHRINE HYDROCHLORIDE 100 MCG: 10 INJECTION INTRAVENOUS at 10:22

## 2020-06-23 RX ADMIN — FENTANYL CITRATE 50 MCG: 50 INJECTION, SOLUTION INTRAMUSCULAR; INTRAVENOUS at 10:34

## 2020-06-23 RX ADMIN — NALOXONE HYDROCHLORIDE 40 MCG: 0.4 INJECTION, SOLUTION INTRAMUSCULAR; INTRAVENOUS; SUBCUTANEOUS at 12:49

## 2020-06-23 RX ADMIN — HYDROMORPHONE HYDROCHLORIDE 0.5 MG: 1 INJECTION, SOLUTION INTRAMUSCULAR; INTRAVENOUS; SUBCUTANEOUS at 13:58

## 2020-06-23 RX ADMIN — DEXMEDETOMIDINE HYDROCHLORIDE 12 MCG: 100 INJECTION, SOLUTION INTRAVENOUS at 11:14

## 2020-06-23 RX ADMIN — PROPOFOL 150 MCG/KG/MIN: 10 INJECTION, EMULSION INTRAVENOUS at 10:13

## 2020-06-23 RX ADMIN — ROCURONIUM BROMIDE 10 MG: 10 INJECTION INTRAVENOUS at 11:18

## 2020-06-23 RX ADMIN — ONDANSETRON 4 MG: 2 INJECTION INTRAMUSCULAR; INTRAVENOUS at 10:24

## 2020-06-23 RX ADMIN — FENTANYL CITRATE 50 MCG: 0.05 INJECTION, SOLUTION INTRAMUSCULAR; INTRAVENOUS at 13:47

## 2020-06-23 RX ADMIN — HYDROMORPHONE HYDROCHLORIDE 0.4 MG: 1 INJECTION, SOLUTION INTRAMUSCULAR; INTRAVENOUS; SUBCUTANEOUS at 22:05

## 2020-06-23 RX ADMIN — ROCURONIUM BROMIDE 5 MG: 10 INJECTION INTRAVENOUS at 11:45

## 2020-06-23 RX ADMIN — SODIUM CHLORIDE 1000 ML: 0.9 IRRIGANT IRRIGATION at 11:38

## 2020-06-23 RX ADMIN — SODIUM CHLORIDE, POTASSIUM CHLORIDE, SODIUM LACTATE AND CALCIUM CHLORIDE: 600; 310; 30; 20 INJECTION, SOLUTION INTRAVENOUS at 11:17

## 2020-06-23 RX ADMIN — DEXAMETHASONE SODIUM PHOSPHATE 4 MG: 4 INJECTION, SOLUTION INTRA-ARTICULAR; INTRALESIONAL; INTRAMUSCULAR; INTRAVENOUS; SOFT TISSUE at 10:24

## 2020-06-23 RX ADMIN — NEOSTIGMINE METHYLSULFATE 4 MG: 1 INJECTION, SOLUTION INTRAVENOUS at 12:02

## 2020-06-23 RX ADMIN — FENTANYL CITRATE 50 MCG: 50 INJECTION, SOLUTION INTRAMUSCULAR; INTRAVENOUS at 10:53

## 2020-06-23 RX ADMIN — GLYCOPYRROLATE 0.6 MG: 0.2 INJECTION, SOLUTION INTRAMUSCULAR; INTRAVENOUS at 12:02

## 2020-06-23 ASSESSMENT — MIFFLIN-ST. JEOR: SCORE: 1427

## 2020-06-23 ASSESSMENT — ENCOUNTER SYMPTOMS
SEIZURES: 0
ORTHOPNEA: 0

## 2020-06-23 NOTE — ANESTHESIA PREPROCEDURE EVALUATION
Anesthesia Pre-Procedure Evaluation    Patient: Jose Hurtado   MRN: [id number] : 1985          Preoperative Diagnosis: Myoma [D21.9]    Procedure(s):  LAPAROTOMY, multiple myomectomies    Past Medical History:   Diagnosis Date     NO ACTIVE PROBLEMS      Past Surgical History:   Procedure Laterality Date     AS HYSTEROSCOPY W ENDOMETRIAL BX/POLYPECTOMY W/WO D&C  2018     MYOMECTOMY UTERUS  2016     HGB 11.5 2020    Anesthesia Evaluation     . Pt has had prior anesthetic.     No history of anesthetic complications          ROS/MED HX    ENT/Pulmonary:      (-) sleep apnea and recent URI   Neurologic:      (-) seizures, CVA and migraines   Cardiovascular:  - neg cardiovascular ROS      (-) CHF and orthopnea/PND   METS/Exercise Tolerance:     Hematologic: Comments: Anemia - has dropped her HGB into the 7's related to menorrhagia.  Anemia being treated with iron infusions. HGB 10.1 2020    (+) Anemia, -      Musculoskeletal:  - neg musculoskeletal ROS       GI/Hepatic:  - neg GI/hepatic ROS      (-) GERD   Renal/Genitourinary:  - ROS Renal section negative       Endo:  - neg endo ROS    (-) Type II DM and thyroid disease   Psychiatric:  - neg psychiatric ROS       Infectious Disease:  - neg infectious disease ROS       Malignancy:      - no malignancy   Other: Comment: menorrrhagia  Fibroid uterus                         Physical Exam  Normal systems: cardiovascular, pulmonary and dental    Airway   Mallampati: II  TM distance: >3 FB  Neck ROM: full  Comment: Small mouth opening    Dental     Cardiovascular   Rhythm and rate: regular and normal      Pulmonary             Lab Results   Component Value Date    HGB 10.1 (L) 2020       Preop Vitals  BP Readings from Last 3 Encounters:   20 112/68   20 114/60   20 110/72    Pulse Readings from Last 3 Encounters:   20 68   20 68   20 105      Resp Readings from Last 3 Encounters:   No data found for Resp    SpO2  "Readings from Last 3 Encounters:   08/31/12 100%      Temp Readings from Last 1 Encounters:   08/31/12 36.7  C (98.1  F) (Oral)    Ht Readings from Last 1 Encounters:   04/01/20 1.613 m (5' 3.5\")      Wt Readings from Last 1 Encounters:   06/11/20 74.4 kg (164 lb)    Estimated body mass index is 28.6 kg/m  as calculated from the following:    Height as of 4/1/20: 1.613 m (5' 3.5\").    Weight as of 6/11/20: 74.4 kg (164 lb).       Anesthesia Plan      History & Physical Review  History and physical reviewed and following examination; no interval change.    ASA Status:  1 .    NPO Status:  > 8 hours    Plan for General with Propofol induction. Maintenance will be TIVA.    PONV prophylaxis:  Ondansetron (or other 5HT-3) and Dexamethasone or Solumedrol         Postoperative Care  Postoperative pain management:  IV analgesics and Oral pain medications.      Consents  Anesthetic plan, risks, benefits and alternatives discussed with:  Patient..                 Brandon Montes De Oca MD  "

## 2020-06-23 NOTE — ANESTHESIA CARE TRANSFER NOTE
Patient: Jsoe Hurtado    Procedure(s):  LAPAROTOMY, multiple myomectomies    Diagnosis: Myoma [D21.9]  Diagnosis Additional Information: No value filed.    Anesthesia Type:   General     Note:  Airway :Face Mask  Patient transferred to:PACU  Handoff Report: Identifed the Patient, Identified the Reponsible Provider, Reviewed the pertinent medical history, Discussed the surgical course, Reviewed Intra-OP anesthesia mangement and issues during anesthesia, Set expectations for post-procedure period and Allowed opportunity for questions and acknowledgement of understanding      Vitals: (Last set prior to Anesthesia Care Transfer)    CRNA VITALS  6/23/2020 1225 - 6/23/2020 1259      6/23/2020             NIBP:  136/77    Pulse:  99    NIBP Mean:  97    SpO2:  100 %    Resp Rate (observed):  23    Resp Rate (set):  10                Electronically Signed By: ROSA Quijano CRNA  June 23, 2020  12:59 PM

## 2020-06-23 NOTE — ANESTHESIA POSTPROCEDURE EVALUATION
Patient: Jose Hurtado    Procedure(s):  LAPAROTOMY, multiple myomectomies    Diagnosis:Myoma [D21.9]  Diagnosis Additional Information: No value filed.    Anesthesia Type:  General    Note:  Anesthesia Post Evaluation    Patient location during evaluation: PACU  Patient participation: Able to fully participate in evaluation  Level of consciousness: awake  Pain management: adequate  Airway patency: patent  Cardiovascular status: acceptable  Respiratory status: acceptable  Hydration status: acceptable  PONV: none     Anesthetic complications: None          Last vitals:  Vitals:    06/23/20 1515 06/23/20 1530 06/23/20 1545   BP: 110/74 125/77 127/75   Pulse: 89 108 112   Resp: 16 15 17   Temp:      SpO2: 96% 97% 99%         Electronically Signed By: Lucia Andrews MD, MD  June 23, 2020  4:14 PM

## 2020-06-23 NOTE — PLAN OF CARE
Floor arrived 1700. VSS. A/O. Lower abdo incision dressing marked, no changes. Dilaudid given x1. Tolerating clears. Good UOP via mijares.

## 2020-06-23 NOTE — PLAN OF CARE
Report given to NICO Ziegler on 33. Pt room not ready yet, awaiting room to be assigned to send to floor.

## 2020-06-23 NOTE — BRIEF OP NOTE
[facility] Brief Operative Note    Pre-operative diagnosis: Myoma [D21.9]   Post-operative diagnosis MYOMA X 17   Procedure: Procedure(s):  LAPAROTOMY, multiple myomectomies   Surgeon(s): Surgeon(s) and Role:     * Tato Jackson MD - Primary   Estimated blood loss: 600 mL    Specimens: ID Type Source Tests Collected by Time Destination   A : Uterine Myomas x 17 Tissue Uterus SURGICAL PATHOLOGY EXAM Tato Jackson MD 6/23/2020 11:00 AM       Findings: MULT MYOMA

## 2020-06-24 LAB
COPATH REPORT: NORMAL
GLUCOSE BLDC GLUCOMTR-MCNC: 142 MG/DL (ref 70–99)
HGB BLD-MCNC: 8.8 G/DL (ref 11.7–15.7)

## 2020-06-24 PROCEDURE — 00000146 ZZHCL STATISTIC GLUCOSE BY METER IP

## 2020-06-24 PROCEDURE — 25000132 ZZH RX MED GY IP 250 OP 250 PS 637: Performed by: OBSTETRICS & GYNECOLOGY

## 2020-06-24 PROCEDURE — 85018 HEMOGLOBIN: CPT | Performed by: OBSTETRICS & GYNECOLOGY

## 2020-06-24 PROCEDURE — 36415 COLL VENOUS BLD VENIPUNCTURE: CPT | Performed by: OBSTETRICS & GYNECOLOGY

## 2020-06-24 PROCEDURE — 25000128 H RX IP 250 OP 636: Performed by: OBSTETRICS & GYNECOLOGY

## 2020-06-24 PROCEDURE — 12000000 ZZH R&B MED SURG/OB

## 2020-06-24 RX ORDER — ACETAMINOPHEN 325 MG/1
325-650 TABLET ORAL EVERY 4 HOURS PRN
Status: DISCONTINUED | OUTPATIENT
Start: 2020-06-24 | End: 2020-06-25 | Stop reason: HOSPADM

## 2020-06-24 RX ORDER — KETOROLAC TROMETHAMINE 30 MG/ML
30 INJECTION, SOLUTION INTRAMUSCULAR; INTRAVENOUS EVERY 6 HOURS PRN
Status: DISCONTINUED | OUTPATIENT
Start: 2020-06-24 | End: 2020-06-25 | Stop reason: CLARIF

## 2020-06-24 RX ORDER — IBUPROFEN 600 MG/1
600 TABLET, FILM COATED ORAL EVERY 6 HOURS PRN
Status: DISCONTINUED | OUTPATIENT
Start: 2020-06-29 | End: 2020-06-25

## 2020-06-24 RX ADMIN — ACETAMINOPHEN 650 MG: 325 TABLET, FILM COATED ORAL at 19:49

## 2020-06-24 RX ADMIN — HYDROMORPHONE HYDROCHLORIDE 0.4 MG: 1 INJECTION, SOLUTION INTRAMUSCULAR; INTRAVENOUS; SUBCUTANEOUS at 02:05

## 2020-06-24 RX ADMIN — CEFAZOLIN SODIUM 2 G: 2 INJECTION, SOLUTION INTRAVENOUS at 02:06

## 2020-06-24 RX ADMIN — KETOROLAC TROMETHAMINE 30 MG: 30 INJECTION, SOLUTION INTRAMUSCULAR at 14:27

## 2020-06-24 RX ADMIN — KETOROLAC TROMETHAMINE 30 MG: 30 INJECTION, SOLUTION INTRAMUSCULAR at 08:29

## 2020-06-24 RX ADMIN — HYDROMORPHONE HYDROCHLORIDE 0.4 MG: 1 INJECTION, SOLUTION INTRAMUSCULAR; INTRAVENOUS; SUBCUTANEOUS at 04:47

## 2020-06-24 ASSESSMENT — ACTIVITIES OF DAILY LIVING (ADL)
ADLS_ACUITY_SCORE: 10

## 2020-06-24 NOTE — PROVIDER NOTIFICATION
Dr Jackson office called to change in pain medication. Pt like to have tylenol and oxy separately. New order of dilaudid 0.4 mg Q2 hr.

## 2020-06-24 NOTE — PLAN OF CARE
Pain improving with toradol, but IV later infiltrated. Pt refused to have another IV put it. Tylenol and ibuprofen is available to manage pain. Adequate I/O. Still have some vaginal spotting but improving per pt. Up independently. Plan to discharge tomorrow.

## 2020-06-24 NOTE — PLAN OF CARE
Pt is A&O x 4, AVSS on room air. Hypoactive BS, no flatus. Good urine output via mijares. Pain managed with dilaudid, but pt prefers ibuprofen or tylenol; not percocet. Dressing marked, no change.ambulated in room.

## 2020-06-24 NOTE — PROGRESS NOTES
Called Dr. Jackson regarding pt refused to have a new IV put in. Pt request ibuprofen and tylenol. Per Dr. Jackson, will order 600mg Ibuprofen every 6hr and tylenol 325mg 1-2 tablets every 4hrs. Orders with read back.

## 2020-06-24 NOTE — OP NOTE
Procedure Date: 06/23/2020      REASON FOR ADMISSION:  Multiple myomas.      POSTOPERATIVE DIAGNOSIS:  Multiple myomas.      OPERATIVE PROCEDURE:  Exploratory laparotomy and multiple myomectomies.      OPERATIVE FINDINGS:  The patient had numerous fibroids throughout the musculature of the uterus.  Seventeen fibroids were removed ranging from 1-8 cm.  Both tubes and ovaries were normal in appearance and well vascularized throughout the case.      OPERATIVE PROCEDURE:  After general anesthesia was induced, the patient was placed in the supine position and prepped and draped in the usual fashion.  A Fierro catheter had been placed.        A Pfannenstiel incision was made through a previous scar line.  The bowel was packed out of the pelvis.  [place] retractors were used to retract the abdominal wall.  We approached the anterior fibroids first through a midline incision.  Seven fibroids were taken out of the anterior space and defects closed in 3 layers with 2-0 and 0 Vicryl.  We then approached an additional 8 fibroids through a midline posterior incision.  The defects were all closed with 0 and 2-0 Vicryl.  The patient had a small subserosal and a deep right intraligamentous fibroid under the round ligament.  These were removed for a total of 17.  All defects were hemostatic at the conclusion of the case.  Copious irrigation was undertaken.  One sheet of Interceed was placed over the fundus of the uterus and posteriorly.      The laps were removed.  Secondary irrigation was undertaken.  The anterior peritoneum was closed with 2-0 Vicryl.  The subfascial space was irrigated and fascia closed with running 0 Vicryl and 5 interrupted 0 Vicryl sutures.  The subcutaneous space was irrigated and the fat was closed with 3-0 plain.  A running subcuticular 4-0 Vicryl was used to close the skin.  Steri-Strips were placed.  The estimated blood loss was 600 mL.  The patient tolerated this well.  Urine output for the case was  excellent.         MANUEL BARRETT JR, MD             D: 2020   T: 2020   MT:       Name:     JODI THAKKAR   MRN:      [id number]        Account:        [id number]   :      1985           Procedure Date: 2020      Document: [id number]

## 2020-06-25 VITALS
DIASTOLIC BLOOD PRESSURE: 59 MMHG | HEIGHT: 64 IN | BODY MASS INDEX: 27.55 KG/M2 | WEIGHT: 161.38 LBS | RESPIRATION RATE: 14 BRPM | SYSTOLIC BLOOD PRESSURE: 102 MMHG | TEMPERATURE: 98.7 F | OXYGEN SATURATION: 100 % | HEART RATE: 109 BPM

## 2020-06-25 LAB — GLUCOSE BLDC GLUCOMTR-MCNC: 86 MG/DL (ref 70–99)

## 2020-06-25 PROCEDURE — 0UB90ZZ EXCISION OF UTERUS, OPEN APPROACH: ICD-10-PCS | Performed by: OBSTETRICS & GYNECOLOGY

## 2020-06-25 PROCEDURE — 00000146 ZZHCL STATISTIC GLUCOSE BY METER IP

## 2020-06-25 PROCEDURE — 25000132 ZZH RX MED GY IP 250 OP 250 PS 637: Performed by: OBSTETRICS & GYNECOLOGY

## 2020-06-25 RX ORDER — IBUPROFEN 600 MG/1
600 TABLET, FILM COATED ORAL EVERY 6 HOURS PRN
Status: DISCONTINUED | OUTPATIENT
Start: 2020-06-25 | End: 2020-06-25 | Stop reason: HOSPADM

## 2020-06-25 RX ADMIN — IBUPROFEN 600 MG: 600 TABLET ORAL at 10:02

## 2020-06-25 RX ADMIN — ACETAMINOPHEN 650 MG: 325 TABLET, FILM COATED ORAL at 03:14

## 2020-06-25 ASSESSMENT — ACTIVITIES OF DAILY LIVING (ADL)
ADLS_ACUITY_SCORE: 10

## 2020-06-25 NOTE — PLAN OF CARE
POD 2 from a laparotomy, myomectomy. A&O. CMS intact. Bowel sounds hypoactive, little flatus, tolerating regular diet. VSS/soft BP. Incision CDI with steri strips. Vaginal spotting remaining the same. Up ad raza independent. C/o mild to moderate pain, decreased with Tylenol. Discharge home today.

## 2020-06-25 NOTE — PLAN OF CARE
Still having scant vaginal bleeding, but improving per pt. Ibuprofen given for pain. Incision CDI, open to air. Adequate I/O. Discharge instruction went over with pt. Questions and concerns answered.

## 2020-06-29 NOTE — DISCHARGE SUMMARY
The patient is a 35-year-old who is admitted for exploratory laparotomy and multiple myomectomies.  Her surgery was complicated by a large volume of abnormal myomatous tissue.  She had 17 fibroids removed and extensive reconstruction undertaken.  Her ovaries and tubes look normal.  Her pathology was benign.  Her postop hemoglobin stabilized at 8.8 g%.  The patient's low hemoglobin was a combination of ongoing loss prior to surgery and surgical loss.  She was discharged on the second postoperative day to return to the office in 2 weeks for a postoperative check.  She is asked to call for any fever chills change in bowel or bladder function.  She is given Percocet as needed for pain.  She will continue on iron and we will follow-up her hemoglobin in the office.

## 2020-07-07 ENCOUNTER — OFFICE VISIT (OUTPATIENT)
Dept: OBGYN | Facility: CLINIC | Age: 35
End: 2020-07-07
Payer: COMMERCIAL

## 2020-07-07 VITALS
SYSTOLIC BLOOD PRESSURE: 110 MMHG | BODY MASS INDEX: 27.14 KG/M2 | DIASTOLIC BLOOD PRESSURE: 70 MMHG | HEIGHT: 64 IN | WEIGHT: 159 LBS

## 2020-07-07 DIAGNOSIS — Z09 POSTOP CHECK: Primary | ICD-10-CM

## 2020-07-07 DIAGNOSIS — Z48.816 AFTERCARE FOLLOWING SURGERY OF THE GENITOURINARY SYSTEM: Primary | ICD-10-CM

## 2020-07-07 DIAGNOSIS — D64.89 ANEMIA DUE TO OTHER CAUSE, NOT CLASSIFIED: ICD-10-CM

## 2020-07-07 DIAGNOSIS — Z48.816 AFTERCARE FOLLOWING SURGERY OF THE GENITOURINARY SYSTEM: ICD-10-CM

## 2020-07-07 LAB — HGB BLD-MCNC: 8.6 G/DL (ref 11.7–15.7)

## 2020-07-07 PROCEDURE — 99024 POSTOP FOLLOW-UP VISIT: CPT | Performed by: OBSTETRICS & GYNECOLOGY

## 2020-07-07 PROCEDURE — 85018 HEMOGLOBIN: CPT | Performed by: OBSTETRICS & GYNECOLOGY

## 2020-07-07 PROCEDURE — 36415 COLL VENOUS BLD VENIPUNCTURE: CPT | Performed by: OBSTETRICS & GYNECOLOGY

## 2020-07-07 ASSESSMENT — MIFFLIN-ST. JEOR: SCORE: 1411.22

## 2020-07-07 NOTE — PROGRESS NOTES
The patient is seen at this time for her postoperative check after an exploratory laparotomy with removal of 17 benign fibroids.  Unfortunately she had a menses 2 days after she was home that lasted for 7 days.  Her hemoglobin today is 8.6 g% which is less than when she left the hospital.  Her incision is healing well at this time.  She will continue her iron and see us in September.  She is not to attempt pregnancy until after her exam in late September and into her October cycle.

## 2020-07-09 ENCOUNTER — TELEPHONE (OUTPATIENT)
Dept: OBGYN | Facility: CLINIC | Age: 35
End: 2020-07-09

## 2020-07-09 NOTE — TELEPHONE ENCOUNTER
Type of Paperwork received:  FMLAS    Date Rcvd:  7/9/2020    Rcvd From (Company name): [employer]    Provider:  ARNOLD Robertson on Provider Cart Date:  7/9/2020

## 2020-09-17 NOTE — PROGRESS NOTES
SUBJECTIVE:                                                   Jose Hurtado is a 35 year old female who presents to clinic today for the following health issue(s):  Patient presents with:  Follow Up: 20: Exploratory laparotomy and multiple myomectomies.       HPI: The patient is seen at this time for follow-up of multiple myomectomies.  She had 17 fibroids removed in .  She has had 2 menses that have been 7 days and still some heavy bleeding.  She also has a watery vaginal discharge without any itching nature to it.  She does have to wear a panty liner because of this discharge.      Patient's last menstrual period was 2020..     Patient is sexually active, .  Using none for contraception.    reports that she has never smoked. She has never used smokeless tobacco.    STD testing offered?  Declined    Health maintenance updated:  yes    Today's PHQ-2 Score:   PHQ-2 (  [employer]) 2020   Q1: Little interest or pleasure in doing things 0   Q2: Feeling down, depressed or hopeless 0   PHQ-2 Score 0     Today's PHQ-9 Score:   PHQ-9 SCORE 3/24/2020   PHQ-9 Total Score 0     Today's KATHI-7 Score:   KATHI-7 SCORE 3/24/2020   Total Score 0       Problem list and histories reviewed & adjusted, as indicated.  Additional history: as documented.    Patient Active Problem List   Diagnosis     Lumbar pain     Myoma     Past Surgical History:   Procedure Laterality Date     AS HYSTEROSCOPY W ENDOMETRIAL BX/POLYPECTOMY W/WO D&C  2018     MYOMECTOMY UTERUS  2016     MYOMECTOMY UTERUS N/A 2020    Procedure: LAPAROTOMY, multiple myomectomies;  Surgeon: Tato Jackson MD;  Location:  OR      Social History     Tobacco Use     Smoking status: Never Smoker     Smokeless tobacco: Never Used   Substance Use Topics     Alcohol use: No      Problem (# of Occurrences) Relation (Name,Age of Onset)    No Known Problems (6) Mother, Father, Maternal Grandmother, Maternal Grandfather, Paternal Grandmother,  "Paternal Grandfather            No current outpatient medications on file.     No current facility-administered medications for this visit.      No Known Allergies    ROS:  12 point review of systems negative other than symptoms noted below or in the HPI.  Genitourinary: Vaginal Discharge  No urinary frequency or dysuria, bladder or kidney problems      OBJECTIVE:     /68   Pulse 76   Ht 1.626 m (5' 4\")   Wt 73 kg (161 lb)   LMP 09/18/2020   BMI 27.64 kg/m    Body mass index is 27.64 kg/m .    Exam:  Constitutional:  Appearance: Well nourished, well developed alert, in no acute distress  Gastrointestinal:  Abdominal Examination:  Abdomen nontender to palpation, tone normal without rigidity or guarding, no masses present, umbilicus without lesions; Liver/Spleen:  No hepatomegaly present, liver nontender to palpation; Hernias:  No hernias present  Lymphatic: Lymph Nodes:  No other lymphadenopathy present  Skin: General Inspection:  No rashes present, no lesions present, no areas of discoloration.  Neurologic:  Mental Status:  Oriented X3.  Normal strength and tone, sensory exam grossly normal, mentation intact and speech normal.    Psychiatric:  Mentation appears normal and affect normal/bright.  Pelvic Exam:  External Genitalia:     Normal appearance for age, no discharge present, no tenderness present, no inflammatory lesions present, color normal  Vagina:     Normal vaginal vault without central or paravaginal defects, no discharge present, no inflammatory lesions present, no masses present  Bladder:     Nontender to palpation  Urethra:   Urethral Body:  Urethra palpation normal, urethra structural support normal   Urethral Meatus:  No erythema or lesions present  Cervix:     Appearance healthy, no lesions present, nontender to palpation, no bleeding present  Uterus: 12-week size uterus   : firm,nontender, anteverted in position.   Adnexa:     No adnexal tenderness present, no adnexal masses " present  Perineum:     Perineum within normal limits, no evidence of trauma, no rashes or skin lesions present  Anus:     Anus within normal limits, no hemorrhoids present  Inguinal Lymph Nodes:     No lymphadenopathy present  Pubic Hair:     Normal pubic hair distribution for age  Genitalia and Groin:     No rashes present, no lesions present, no areas of discoloration, no masses present       In-Clinic Test Results:      ASSESSMENT/PLAN:                                                        ICD-10-CM    1. Vaginal discharge  N89.8      Patient with a large amount of cervical and vaginal mucus present.  This does not look like infection.  The uterus approximately 12 weeks size and nontender.  She is still bleeding fairly heavily and we will perform a vaginal probe ultrasound and see her back.      Tato Jackson MD  [facility] [place]

## 2020-09-21 ENCOUNTER — OFFICE VISIT (OUTPATIENT)
Dept: OBGYN | Facility: CLINIC | Age: 35
End: 2020-09-21
Payer: COMMERCIAL

## 2020-09-21 VITALS
WEIGHT: 161 LBS | BODY MASS INDEX: 27.49 KG/M2 | HEIGHT: 64 IN | SYSTOLIC BLOOD PRESSURE: 114 MMHG | HEART RATE: 76 BPM | DIASTOLIC BLOOD PRESSURE: 68 MMHG

## 2020-09-21 DIAGNOSIS — N89.8 VAGINAL DISCHARGE: Primary | ICD-10-CM

## 2020-09-21 DIAGNOSIS — D21.9 MYOMA: ICD-10-CM

## 2020-09-21 PROCEDURE — 87653 STREP B DNA AMP PROBE: CPT | Performed by: OBSTETRICS & GYNECOLOGY

## 2020-09-21 PROCEDURE — 87205 SMEAR GRAM STAIN: CPT | Performed by: OBSTETRICS & GYNECOLOGY

## 2020-09-21 PROCEDURE — 87102 FUNGUS ISOLATION CULTURE: CPT | Performed by: OBSTETRICS & GYNECOLOGY

## 2020-09-21 PROCEDURE — 87186 SC STD MICRODIL/AGAR DIL: CPT | Performed by: OBSTETRICS & GYNECOLOGY

## 2020-09-21 PROCEDURE — 99024 POSTOP FOLLOW-UP VISIT: CPT | Performed by: OBSTETRICS & GYNECOLOGY

## 2020-09-21 ASSESSMENT — MIFFLIN-ST. JEOR: SCORE: 1420.29

## 2020-09-22 LAB
GRAM STN SPEC: ABNORMAL
Lab: ABNORMAL
SPECIMEN SOURCE: ABNORMAL

## 2020-09-23 LAB
GP B STREP DNA SPEC QL NAA+PROBE: POSITIVE
SPECIMEN SOURCE: ABNORMAL

## 2020-09-25 DIAGNOSIS — N76.0 BACTERIAL VAGINOSIS: Primary | ICD-10-CM

## 2020-09-25 DIAGNOSIS — B96.89 BACTERIAL VAGINOSIS: Primary | ICD-10-CM

## 2020-09-25 RX ORDER — METRONIDAZOLE 7.5 MG/G
1 GEL VAGINAL AT BEDTIME
Qty: 70 G | Refills: 0 | Status: SHIPPED | OUTPATIENT
Start: 2020-09-25 | End: 2020-09-30

## 2020-09-26 LAB
BACTERIA SPEC CULT: ABNORMAL
SPECIMEN SOURCE: ABNORMAL

## 2020-09-28 LAB
Lab: NORMAL
SPECIMEN SOURCE: NORMAL
YEAST SPEC QL CULT: NORMAL

## 2020-09-30 NOTE — PROGRESS NOTES
SUBJECTIVE:                                                   Jose Hurtado is a 35 year old female who presents to clinic today for the following health issue(s):  Patient presents with:  Follow Up: ultrasound      Additional information:     HPI: The patient is seen at this time for follow-up of an exploratory laparotomy with multiple myomectomies there was performed in .  17 fibroids were taken out at that time.  The patient was recently seen and had some mucus discharge and to normal cycles.  Ultrasound is scheduled today.  The patient has desires of childbearing yet.      Patient's last menstrual period was 2020.    Patient is sexually active, .  Using none for contraception.    reports that she has never smoked. She has never used smokeless tobacco.      Health maintenance updated:  yes    Today's PHQ-2 Score:   PHQ-2 (  [employer]) 2020   Q1: Little interest or pleasure in doing things 0   Q2: Feeling down, depressed or hopeless 0   PHQ-2 Score 0     Today's PHQ-9 Score:   PHQ-9 SCORE 3/24/2020   PHQ-9 Total Score 0     Today's KATHI-7 Score:   KATHI-7 SCORE 3/24/2020   Total Score 0       Problem list and histories reviewed & adjusted, as indicated.  Additional history: as documented.    Patient Active Problem List   Diagnosis     Lumbar pain     Myoma     Past Surgical History:   Procedure Laterality Date     AS HYSTEROSCOPY W ENDOMETRIAL BX/POLYPECTOMY W/WO D&C  2018     MYOMECTOMY UTERUS  2016     MYOMECTOMY UTERUS N/A 2020    Procedure: LAPAROTOMY, multiple myomectomies;  Surgeon: Tato Jackson MD;  Location:  [facility]      Social History     Tobacco Use     Smoking status: Never Smoker     Smokeless tobacco: Never Used   Substance Use Topics     Alcohol use: No      Problem (# of Occurrences) Relation (Name,Age of Onset)    No Known Problems (6) Mother, Father, Maternal Grandmother, Maternal Grandfather, Paternal Grandmother, Paternal Grandfather            No current outpatient  medications on file.     No current facility-administered medications for this visit.      No Known Allergies    ROS:  12 point review of systems negative other than symptoms noted below or in the HPI.  No urinary frequency or dysuria, bladder or kidney problems      OBJECTIVE:     LMP 09/18/2020   There is no height or weight on file to calculate BMI.    Exam:  Constitutional:  Appearance: Well nourished, well developed alert, in no acute distress     In-Clinic Test Results:  Results for orders placed or performed in visit on 10/01/20   US Pelvic Complete with Transvaginal     Status: None    Narrative    US Pelvic Complete with Transvaginal  Order #: [id number] Accession #: [id number]  Study Notes     Elisa Woods on 10/1/2020 12:16 PM        US Pelvic Complete with Transvaginal  Order #: [id number] Accession #: [id number]  Study Notes     Elisa Woods on 10/1/2020 12:16 PM      Gynecological Ultrasound Report  Pelvic U/S - Transabdominal and Transvaginal  [facility]  Referring Provider: Tato Jackson MD  Sonographer:  Elisa Woods RDMS  Indication: Fibroids  LMP: Patient's last menstrual period was 09/18/2020.  History: myomectomy 6/2020  Gynecological Ultrasonography:   Uterus: anteverted. Contour is irregular w/ myomata: 1 Right Intramural   5.61 x 3.92 x 4.73 cm, 2 Midline Submucosal CITLALI 2.13 x 2.13 x 2.40 cm, 3   Left Fundal Submucosal 3.58 x 3.24 x 2.93 cm and others smaller.  Size: 13.58 x 12.04 x 10.64 cm  Endometrium: Thickness Total 23.1 mm  Findings: myomatous uterus, right uterus heterogenous difficult to see   well circumscribed masses  Right Ovary: 3.42 x 1.72 x 2.95 cm. Wnl  Left Ovary: 4.58 x 4.44 x 3.51 cm. Complex cyst 3.30 x 2.86 x 2.85  cm  Cul de Sac Free Fluid: Mild     Impression: Multiple uterine fibroids and scar tissue from previous   myomectomy.  Thickened endometrial stripe.  Complex left ovarian mass  EM Renetta EVANS             ASSESSMENT/PLAN:                                                         Ultrasound shows at least 4 residual fibroids and some scarring on the right side of the uterus.  The endometrium is clearly identified at this time and the cavity looks fairly normal.  There is a hemorrhagic cyst in the left ovary that is probably from ovulation.  The findings were reviewed with the patient and she will call back to her reproductive people to determine next steps.        Tato Jackson MD  [facility] [place]

## 2020-10-01 ENCOUNTER — OFFICE VISIT (OUTPATIENT)
Dept: OBGYN | Facility: CLINIC | Age: 35
End: 2020-10-01
Attending: OBSTETRICS & GYNECOLOGY
Payer: COMMERCIAL

## 2020-10-01 ENCOUNTER — ANCILLARY PROCEDURE (OUTPATIENT)
Dept: ULTRASOUND IMAGING | Facility: CLINIC | Age: 35
End: 2020-10-01
Attending: OBSTETRICS & GYNECOLOGY
Payer: COMMERCIAL

## 2020-10-01 VITALS — WEIGHT: 163.6 LBS | DIASTOLIC BLOOD PRESSURE: 60 MMHG | BODY MASS INDEX: 28.08 KG/M2 | SYSTOLIC BLOOD PRESSURE: 104 MMHG

## 2020-10-01 DIAGNOSIS — D21.9 MYOMA: ICD-10-CM

## 2020-10-01 DIAGNOSIS — D21.9 MYOMA: Primary | ICD-10-CM

## 2020-10-01 PROCEDURE — 99212 OFFICE O/P EST SF 10 MIN: CPT | Performed by: OBSTETRICS & GYNECOLOGY

## 2020-10-01 PROCEDURE — 76856 US EXAM PELVIC COMPLETE: CPT

## 2020-10-01 PROCEDURE — 76830 TRANSVAGINAL US NON-OB: CPT

## 2020-12-12 ENCOUNTER — HEALTH MAINTENANCE LETTER (OUTPATIENT)
Age: 35
End: 2020-12-12

## 2021-09-26 ENCOUNTER — HEALTH MAINTENANCE LETTER (OUTPATIENT)
Age: 36
End: 2021-09-26

## 2022-01-16 ENCOUNTER — HEALTH MAINTENANCE LETTER (OUTPATIENT)
Age: 37
End: 2022-01-16

## 2023-01-08 ENCOUNTER — HEALTH MAINTENANCE LETTER (OUTPATIENT)
Age: 38
End: 2023-01-08

## 2023-04-23 ENCOUNTER — HEALTH MAINTENANCE LETTER (OUTPATIENT)
Age: 38
End: 2023-04-23

## 2023-08-23 ENCOUNTER — ANCILLARY ORDERS (OUTPATIENT)
Dept: GENERAL RADIOLOGY | Facility: CLINIC | Age: 38
End: 2023-08-23

## 2023-08-23 DIAGNOSIS — Z31.41 FERTILITY TESTING: Primary | ICD-10-CM

## 2023-08-25 ENCOUNTER — ANCILLARY PROCEDURE (OUTPATIENT)
Dept: GENERAL RADIOLOGY | Facility: CLINIC | Age: 38
End: 2023-08-25
Attending: OBSTETRICS & GYNECOLOGY
Payer: COMMERCIAL

## 2023-08-25 DIAGNOSIS — Z31.41 FERTILITY TESTING: ICD-10-CM

## 2023-08-25 PROCEDURE — 74740 X-RAY FEMALE GENITAL TRACT: CPT

## 2023-08-25 PROCEDURE — 58340 CATHETER FOR HYSTEROGRAPHY: CPT

## 2024-06-29 ENCOUNTER — HEALTH MAINTENANCE LETTER (OUTPATIENT)
Age: 39
End: 2024-06-29

## 2025-07-13 ENCOUNTER — HEALTH MAINTENANCE LETTER (OUTPATIENT)
Age: 40
End: 2025-07-13

## (undated) DEVICE — SYR 03ML LL W/O NDL 309657

## (undated) DEVICE — SOL NACL 0.9% IRRIG 1000ML BOTTLE 2F7124

## (undated) DEVICE — SU VICRYL 0 TIE 12X18" J906G

## (undated) DEVICE — WIPES FOLEY CARE SURESTEP PROVON DFC100

## (undated) DEVICE — SYR 30ML SLIP TIP W/O NDL 302833

## (undated) DEVICE — SOL WATER IRRIG 1000ML BOTTLE 2F7114

## (undated) DEVICE — SU VICRYL 2-0 CT-2 27" J333H

## (undated) DEVICE — BARRIER INTERCEED 3X4" 4350

## (undated) DEVICE — DRSG STERI STRIP 1/2X4" R1547

## (undated) DEVICE — SU VICRYL 4-0 PS-2 18" UND J496H

## (undated) DEVICE — KIT ABDOMINAL HYST SMA15AHFSM

## (undated) DEVICE — SU VICRYL 0 CT-2 27" J334H

## (undated) DEVICE — SU VICRYL 2-0 CT 36" J357H

## (undated) DEVICE — SYR 10ML FINGER CONTROL W/O NDL 309695

## (undated) DEVICE — NDL 19GA 1.5"

## (undated) DEVICE — SOL ADH LIQUID BENZOIN SWAB 0.6ML C1544

## (undated) DEVICE — CATH TRAY FOLEY SURESTEP 16FR WDRAIN BAG STLK LATEX A300316A

## (undated) DEVICE — DRSG TELFA ISLAND 4X10"

## (undated) DEVICE — SU PLAIN 2-0 CT 27" 853H

## (undated) DEVICE — GLOVE PROTEXIS W/NEU-THERA 8.0  2D73TE80

## (undated) DEVICE — SUCTION CANISTER MEDIVAC LINER 3000ML W/LID 65651-530

## (undated) DEVICE — SU VICRYL 0 CT 36" J358H

## (undated) DEVICE — ESU ELEC NDL 1" COATED/INSULATED E1465

## (undated) DEVICE — GLOVE PROTEXIS MICRO 7.5  2D73PM75

## (undated) DEVICE — DRAPE IOBAN INCISE 23X17" 6650EZ

## (undated) DEVICE — NDL 22GA 1.5"

## (undated) DEVICE — LINEN TOWEL PACK X5 5464

## (undated) DEVICE — ESU ELEC BLADE HEX-LOCKING 2.5" E1450X

## (undated) DEVICE — SU VICRYL 0 CT-1 27" J340H

## (undated) DEVICE — PREP SKIN SCRUB TRAY 4461A

## (undated) RX ORDER — FENTANYL CITRATE 50 UG/ML
INJECTION, SOLUTION INTRAMUSCULAR; INTRAVENOUS
Status: DISPENSED
Start: 2020-06-23

## (undated) RX ORDER — HYDROMORPHONE HYDROCHLORIDE 1 MG/ML
INJECTION, SOLUTION INTRAMUSCULAR; INTRAVENOUS; SUBCUTANEOUS
Status: DISPENSED
Start: 2020-06-23

## (undated) RX ORDER — PROPOFOL 10 MG/ML
INJECTION, EMULSION INTRAVENOUS
Status: DISPENSED
Start: 2020-06-23

## (undated) RX ORDER — ONDANSETRON 2 MG/ML
INJECTION INTRAMUSCULAR; INTRAVENOUS
Status: DISPENSED
Start: 2020-06-23

## (undated) RX ORDER — LIDOCAINE HYDROCHLORIDE 20 MG/ML
INJECTION, SOLUTION EPIDURAL; INFILTRATION; INTRACAUDAL; PERINEURAL
Status: DISPENSED
Start: 2020-06-23

## (undated) RX ORDER — FENTANYL CITRATE 0.05 MG/ML
INJECTION, SOLUTION INTRAMUSCULAR; INTRAVENOUS
Status: DISPENSED
Start: 2020-06-23

## (undated) RX ORDER — NALOXONE HYDROCHLORIDE 0.4 MG/ML
INJECTION, SOLUTION INTRAMUSCULAR; INTRAVENOUS; SUBCUTANEOUS
Status: DISPENSED
Start: 2020-06-23

## (undated) RX ORDER — ALBUMIN, HUMAN INJ 5% 5 %
SOLUTION INTRAVENOUS
Status: DISPENSED
Start: 2020-06-23

## (undated) RX ORDER — ACETAMINOPHEN 500 MG
TABLET ORAL
Status: DISPENSED
Start: 2020-06-23

## (undated) RX ORDER — NEOSTIGMINE METHYLSULFATE 1 MG/ML
VIAL (ML) INJECTION
Status: DISPENSED
Start: 2020-06-23

## (undated) RX ORDER — CEFAZOLIN SODIUM 2 G/100ML
INJECTION, SOLUTION INTRAVENOUS
Status: DISPENSED
Start: 2020-06-23

## (undated) RX ORDER — DEXAMETHASONE SODIUM PHOSPHATE 4 MG/ML
INJECTION, SOLUTION INTRA-ARTICULAR; INTRALESIONAL; INTRAMUSCULAR; INTRAVENOUS; SOFT TISSUE
Status: DISPENSED
Start: 2020-06-23

## (undated) RX ORDER — GLYCOPYRROLATE 0.2 MG/ML
INJECTION, SOLUTION INTRAMUSCULAR; INTRAVENOUS
Status: DISPENSED
Start: 2020-06-23